# Patient Record
Sex: FEMALE | Race: ASIAN | NOT HISPANIC OR LATINO | URBAN - METROPOLITAN AREA
[De-identification: names, ages, dates, MRNs, and addresses within clinical notes are randomized per-mention and may not be internally consistent; named-entity substitution may affect disease eponyms.]

---

## 2017-02-14 ENCOUNTER — OFFICE VISIT - HEALTHEAST (OUTPATIENT)
Dept: FAMILY MEDICINE | Facility: CLINIC | Age: 52
End: 2017-02-14

## 2017-02-14 ENCOUNTER — RECORDS - HEALTHEAST (OUTPATIENT)
Dept: GENERAL RADIOLOGY | Facility: CLINIC | Age: 52
End: 2017-02-14

## 2017-02-14 DIAGNOSIS — E04.1 THYROID NODULE: ICD-10-CM

## 2017-02-14 DIAGNOSIS — R73.9 ELEVATED BLOOD SUGAR: ICD-10-CM

## 2017-02-14 DIAGNOSIS — Z00.00 HEALTH CARE MAINTENANCE: ICD-10-CM

## 2017-02-14 DIAGNOSIS — E78.5 HYPERLIPIDEMIA: ICD-10-CM

## 2017-02-14 DIAGNOSIS — R79.89 LOW TSH LEVEL: ICD-10-CM

## 2017-02-14 DIAGNOSIS — G89.29 OTHER CHRONIC PAIN: ICD-10-CM

## 2017-02-14 DIAGNOSIS — G89.29 CHRONIC PAIN OF RIGHT ANKLE: ICD-10-CM

## 2017-02-14 DIAGNOSIS — M25.571 PAIN IN RIGHT ANKLE AND JOINTS OF RIGHT FOOT: ICD-10-CM

## 2017-02-14 DIAGNOSIS — M25.571 CHRONIC PAIN OF RIGHT ANKLE: ICD-10-CM

## 2017-02-14 LAB
HBA1C MFR BLD: 6.1 % (ref 3.5–6)
LDLC SERPL CALC-MCNC: 77 MG/DL

## 2017-02-15 ENCOUNTER — AMBULATORY - HEALTHEAST (OUTPATIENT)
Dept: FAMILY MEDICINE | Facility: CLINIC | Age: 52
End: 2017-02-15

## 2017-02-15 ENCOUNTER — COMMUNICATION - HEALTHEAST (OUTPATIENT)
Dept: FAMILY MEDICINE | Facility: CLINIC | Age: 52
End: 2017-02-15

## 2017-02-15 DIAGNOSIS — E04.1 THYROID NODULE: ICD-10-CM

## 2017-02-15 DIAGNOSIS — E55.9 VITAMIN D DEFICIENCY: ICD-10-CM

## 2017-02-16 ENCOUNTER — OFFICE VISIT - HEALTHEAST (OUTPATIENT)
Dept: PHYSICAL THERAPY | Facility: REHABILITATION | Age: 52
End: 2017-02-16

## 2017-02-16 DIAGNOSIS — M25.571 CHRONIC PAIN OF RIGHT ANKLE: ICD-10-CM

## 2017-02-16 DIAGNOSIS — M62.81 MUSCLE WEAKNESS (GENERALIZED): ICD-10-CM

## 2017-02-16 DIAGNOSIS — R29.818 DIFFICULTY BALANCING: ICD-10-CM

## 2017-02-16 DIAGNOSIS — G89.29 CHRONIC PAIN OF RIGHT ANKLE: ICD-10-CM

## 2017-02-20 ENCOUNTER — HOSPITAL ENCOUNTER (OUTPATIENT)
Dept: ULTRASOUND IMAGING | Facility: HOSPITAL | Age: 52
Discharge: HOME | End: 2017-02-20
Attending: PHYSICIAN ASSISTANT

## 2017-02-20 DIAGNOSIS — E04.1 THYROID NODULE: ICD-10-CM

## 2017-02-21 ENCOUNTER — OFFICE VISIT - HEALTHEAST (OUTPATIENT)
Dept: PHYSICAL THERAPY | Facility: REHABILITATION | Age: 52
End: 2017-02-21

## 2017-02-21 DIAGNOSIS — R29.818 DIFFICULTY BALANCING: ICD-10-CM

## 2017-02-21 DIAGNOSIS — M25.571 CHRONIC PAIN OF RIGHT ANKLE: ICD-10-CM

## 2017-02-21 DIAGNOSIS — M62.81 MUSCLE WEAKNESS (GENERALIZED): ICD-10-CM

## 2017-02-21 DIAGNOSIS — G89.29 CHRONIC PAIN OF RIGHT ANKLE: ICD-10-CM

## 2017-02-22 ENCOUNTER — COMMUNICATION - HEALTHEAST (OUTPATIENT)
Dept: FAMILY MEDICINE | Facility: CLINIC | Age: 52
End: 2017-02-22

## 2017-02-28 ENCOUNTER — COMMUNICATION - HEALTHEAST (OUTPATIENT)
Dept: FAMILY MEDICINE | Facility: CLINIC | Age: 52
End: 2017-02-28

## 2017-02-28 ENCOUNTER — OFFICE VISIT - HEALTHEAST (OUTPATIENT)
Dept: PHYSICAL THERAPY | Facility: REHABILITATION | Age: 52
End: 2017-02-28

## 2017-02-28 DIAGNOSIS — M25.571 CHRONIC PAIN OF RIGHT ANKLE: ICD-10-CM

## 2017-02-28 DIAGNOSIS — M62.81 MUSCLE WEAKNESS (GENERALIZED): ICD-10-CM

## 2017-02-28 DIAGNOSIS — R29.818 DIFFICULTY BALANCING: ICD-10-CM

## 2017-02-28 DIAGNOSIS — G89.29 CHRONIC PAIN OF RIGHT ANKLE: ICD-10-CM

## 2017-02-28 DIAGNOSIS — R59.9 ENLARGED LYMPH NODE: ICD-10-CM

## 2017-03-07 ENCOUNTER — OFFICE VISIT - HEALTHEAST (OUTPATIENT)
Dept: PHYSICAL THERAPY | Facility: REHABILITATION | Age: 52
End: 2017-03-07

## 2017-03-07 DIAGNOSIS — G89.29 CHRONIC PAIN OF RIGHT ANKLE: ICD-10-CM

## 2017-03-07 DIAGNOSIS — M62.81 MUSCLE WEAKNESS (GENERALIZED): ICD-10-CM

## 2017-03-07 DIAGNOSIS — R29.818 DIFFICULTY BALANCING: ICD-10-CM

## 2017-03-07 DIAGNOSIS — M25.571 CHRONIC PAIN OF RIGHT ANKLE: ICD-10-CM

## 2017-03-22 ENCOUNTER — OFFICE VISIT - HEALTHEAST (OUTPATIENT)
Dept: PHYSICAL THERAPY | Facility: REHABILITATION | Age: 52
End: 2017-03-22

## 2017-03-22 DIAGNOSIS — M62.81 MUSCLE WEAKNESS (GENERALIZED): ICD-10-CM

## 2017-03-22 DIAGNOSIS — R29.818 DIFFICULTY BALANCING: ICD-10-CM

## 2017-03-22 DIAGNOSIS — G89.29 CHRONIC PAIN OF RIGHT ANKLE: ICD-10-CM

## 2017-03-22 DIAGNOSIS — M25.571 CHRONIC PAIN OF RIGHT ANKLE: ICD-10-CM

## 2017-04-05 ENCOUNTER — COMMUNICATION - HEALTHEAST (OUTPATIENT)
Dept: FAMILY MEDICINE | Facility: CLINIC | Age: 52
End: 2017-04-05

## 2017-04-11 ENCOUNTER — COMMUNICATION - HEALTHEAST (OUTPATIENT)
Dept: FAMILY MEDICINE | Facility: CLINIC | Age: 52
End: 2017-04-11

## 2017-04-18 ENCOUNTER — OFFICE VISIT - HEALTHEAST (OUTPATIENT)
Dept: FAMILY MEDICINE | Facility: CLINIC | Age: 52
End: 2017-04-18

## 2017-04-18 ENCOUNTER — RECORDS - HEALTHEAST (OUTPATIENT)
Dept: MAMMOGRAPHY | Facility: CLINIC | Age: 52
End: 2017-04-18

## 2017-04-18 DIAGNOSIS — Z12.31 VISIT FOR SCREENING MAMMOGRAM: ICD-10-CM

## 2017-04-18 DIAGNOSIS — Z12.31 ENCOUNTER FOR SCREENING MAMMOGRAM FOR MALIGNANT NEOPLASM OF BREAST: ICD-10-CM

## 2017-04-18 DIAGNOSIS — G89.29 CHRONIC PAIN OF RIGHT ANKLE: ICD-10-CM

## 2017-04-18 DIAGNOSIS — M25.571 CHRONIC PAIN OF RIGHT ANKLE: ICD-10-CM

## 2017-04-26 ENCOUNTER — COMMUNICATION - HEALTHEAST (OUTPATIENT)
Dept: FAMILY MEDICINE | Facility: CLINIC | Age: 52
End: 2017-04-26

## 2018-03-08 ENCOUNTER — COMMUNICATION - HEALTHEAST (OUTPATIENT)
Dept: FAMILY MEDICINE | Facility: CLINIC | Age: 53
End: 2018-03-08

## 2018-03-08 DIAGNOSIS — E55.9 VITAMIN D DEFICIENCY: ICD-10-CM

## 2018-04-23 ENCOUNTER — RECORDS - HEALTHEAST (OUTPATIENT)
Dept: MAMMOGRAPHY | Facility: CLINIC | Age: 53
End: 2018-04-23

## 2018-04-23 DIAGNOSIS — Z12.31 ENCOUNTER FOR SCREENING MAMMOGRAM FOR MALIGNANT NEOPLASM OF BREAST: ICD-10-CM

## 2018-10-10 ENCOUNTER — OFFICE VISIT - HEALTHEAST (OUTPATIENT)
Dept: FAMILY MEDICINE | Facility: CLINIC | Age: 53
End: 2018-10-10

## 2018-10-10 DIAGNOSIS — R87.610 ATYPICAL SQUAMOUS CELLS OF UNDETERMINED SIGNIFICANCE (ASCUS) ON PAPANICOLAOU SMEAR OF CERVIX: ICD-10-CM

## 2018-10-10 DIAGNOSIS — Z00.00 ANNUAL PHYSICAL EXAM: ICD-10-CM

## 2018-10-10 DIAGNOSIS — R42 DIZZINESS: ICD-10-CM

## 2018-10-10 DIAGNOSIS — R73.03 PRE-DIABETES: ICD-10-CM

## 2018-10-10 DIAGNOSIS — E55.9 VITAMIN D DEFICIENCY: ICD-10-CM

## 2018-10-10 DIAGNOSIS — E78.5 HYPERLIPIDEMIA: ICD-10-CM

## 2018-10-10 DIAGNOSIS — R07.89 CHEST DISCOMFORT: ICD-10-CM

## 2018-10-10 LAB
ALT SERPL W P-5'-P-CCNC: 18 U/L (ref 0–45)
CHOLEST SERPL-MCNC: 133 MG/DL
CREAT SERPL-MCNC: 0.66 MG/DL (ref 0.6–1.1)
FASTING STATUS PATIENT QL REPORTED: YES
GFR SERPL CREATININE-BSD FRML MDRD: >60 ML/MIN/1.73M2
HBA1C MFR BLD: 5.9 % (ref 3.5–6)
HDLC SERPL-MCNC: 38 MG/DL
LDLC SERPL CALC-MCNC: 69 MG/DL
TRIGL SERPL-MCNC: 132 MG/DL

## 2018-10-10 ASSESSMENT — MIFFLIN-ST. JEOR: SCORE: 1149.16

## 2018-10-11 LAB
25(OH)D3 SERPL-MCNC: 18 NG/ML (ref 30–80)
HPV SOURCE: NORMAL
HUMAN PAPILLOMA VIRUS 16 DNA: NEGATIVE
HUMAN PAPILLOMA VIRUS 18 DNA: NEGATIVE
HUMAN PAPILLOMA VIRUS FINAL DIAGNOSIS: NORMAL
HUMAN PAPILLOMA VIRUS OTHER HR: NEGATIVE
SPECIMEN DESCRIPTION: NORMAL

## 2018-10-18 LAB
BKR LAB AP ABNORMAL BLEEDING: NO
BKR LAB AP BIRTH CONTROL/HORMONES: NORMAL
BKR LAB AP CERVICAL APPEARANCE: NORMAL
BKR LAB AP GYN ADEQUACY: NORMAL
BKR LAB AP GYN INTERPRETATION: NORMAL
BKR LAB AP HPV REFLEX: NORMAL
BKR LAB AP LMP: NORMAL
BKR LAB AP PATIENT STATUS: NORMAL
BKR LAB AP PREVIOUS ABNORMAL: NORMAL
BKR LAB AP PREVIOUS NORMAL: 2014
HIGH RISK?: NO
PATH REPORT.COMMENTS IMP SPEC: NORMAL
RESULT FLAG (HE HISTORICAL CONVERSION): NORMAL

## 2019-03-08 ENCOUNTER — COMMUNICATION - HEALTHEAST (OUTPATIENT)
Dept: FAMILY MEDICINE | Facility: CLINIC | Age: 54
End: 2019-03-08

## 2019-03-08 DIAGNOSIS — E55.9 VITAMIN D DEFICIENCY: ICD-10-CM

## 2019-03-09 RX ORDER — ERGOCALCIFEROL 1.25 MG/1
CAPSULE ORAL
Qty: 12 CAPSULE | Refills: 3 | Status: SHIPPED | OUTPATIENT
Start: 2019-03-09

## 2021-05-30 VITALS — BODY MASS INDEX: 28.63 KG/M2 | WEIGHT: 142 LBS

## 2021-05-30 VITALS — WEIGHT: 139 LBS | BODY MASS INDEX: 28.02 KG/M2

## 2021-06-02 VITALS — HEIGHT: 59 IN | BODY MASS INDEX: 28.43 KG/M2 | WEIGHT: 141 LBS

## 2021-06-08 NOTE — PROGRESS NOTES
"  Subjective:      Mini Agudelo is a 52 y.o. female who presents for evaluation of inability to work.  She has a workforce form that needs to be completed today.  She says she is unable to work.  When I ask her why, she says it is because of right leg pain.  This been a problem for years.  With further discussion, she says it is really only her right ankle that is bothersome.  She says [place].  She was walking and somehow twisted her ankle and fell.  She apparently broke her ankle and could not walk on it.  She was taken to the clinic, and then the hospital for surgery.  She had a pin or kika put in the ankle.  She later had the hardware removed.  She says ever since that injury, her ankle has been \"loose\" and chronically painful.  No pain with rest or ROM exercises.  Pain if she is standing or walking for a while (5 minutes?).  No other concerns.  She had a physical done last fall.  She did not mention anything about this at that time.  I have received her old records after that visit.  She had several things that we should follow-up on, including history of hyperglycemia, TSH abnormality, hyperlipidemia, ASCUS Pap.  I reviewed old lab records today.  Also reviewed Thyroid ultrasound from 2014 that showed mild thyromegaly with bilateral thyroid lobe nodules.  This report is scanned in under the media tab.    Patient Active Problem List   Diagnosis     Hyperlipidemia     Pre-diabetes     Low TSH level     ASCUS pap     Latent tuberculosis     Chronic pain of right ankle     No current outpatient prescriptions on file.     Objective:     No Known Allergies  Vitals:    02/14/17 1002   BP: 120/68   Pulse: 80   Resp: 12   SpO2: 97%     Body mass index is 28.02 kg/(m^2).    Vitals reviewed as above.  General: Patient is alert and oriented x 3, in no apparent distress  Cardiac: Regular rate and rhythm, no murmurs  Pulmonary: lungs clear to ausculation, no crackles, rales, rhonchi, or " wheezing  Musculoskeletal: normal 4/5 strength in major muscle groups in lower extremities bilaterally, normal foot strength, bilateral feet have normal pedal pulses, bilateral feet and ankles appear grossly normal, no obvious gross deformities.  No ligament laxity noted in the right ankle, no pain with palpation of the ankle joint, main area of pain is the medial malleolus of the right ankle.    Skin: Skin is healthy and normal.  No bruising or swelling.    Recent Results (from the past 240 hour(s))   LDL Cholesterol, Direct   Result Value Ref Range    Direct LDL 77 <=129 mg/dl   Thyroid Stimulating Hormone (TSH)   Result Value Ref Range    TSH 0.37 0.30 - 5.00 uIU/mL   Glycosylated Hemoglobin A1c   Result Value Ref Range    Hemoglobin A1c 6.1 (H) 3.5 - 6.0 %   Hemoglobin   Result Value Ref Range    Hemoglobin 12.7 12.0 - 16.0 g/dL     Other labs pending.    I personally reviewed ankle x-ray today.  XR ANKLE RIGHT 3 OR MORE VWS2/14/2017 10:42 AM  INDICATION: chronic pain, history of fracture COMPARISON: None.  FINDINGS: Old deformity of the distal fibula. Otherwise negative ankle. No acute fracture or dislocation.  This report was electronically interpreted by: Dr. Greg Jensen MD ON 02/14/2017 at 12:59    Assessment and Plan:     1.  Chronic right ankle pain.  I filled out her workforce form to say that she would be unable to do weightbearing activity for 30-45 days.  I think she would be able to do seated work during this time.  I would like her to see physical therapy to see if they can improve her pain.  If so, then she should be able to work full time without restrictions.  If there appears to be any problems after physical therapy, consider getting her a workability evaluation.    2.  History of pre-diabetes.  A1c today is 6.1.  We'll continue to monitor.    2.  History of abnormal TSH level and mild thyromegaly.  Normal TSH today.  Will get a repeat ultrasound of thyroid, then follow-up as appropriate.    4.   History of hyperlipidemia.  LDL direct is normal today.    5.  History of ASCUS Pap.  She had ASCUS Pap with negative HPV in 2014.  Guidelines would recommend she get another Pap in 2017.  I reviewed that with her today.  She declines to get a Pap today.  She may consider it in the future.    This dictation uses voice recognition software, which may contain typographical errors.

## 2021-06-08 NOTE — PROGRESS NOTES
[facility]   Foot/Ankle Initial Evaluation    Patient Name: Mini Agudelo  Date of evaluation: 2/16/2017  Referral Diagnosis: Chronic right ankle pain  Referring provider: Yancy Bermudez, *  Visit Diagnosis:     ICD-10-CM    1. Chronic pain of right ankle M25.571     G89.29    2. Muscle weakness (generalized) M62.81    3. Difficulty balancing R29.818        Assessment:     Mini Agudelo is a 52 y.o. female who presents to therapy today with chief complaints of ankle pain starting more than 10 years ago when she [place], She states she has surgery to place a kika and then was casted for a couple fo weeks and then she had surgery to remove the hardware and was on crutches for a long time. Patient reports since that time the ankle has felt weak, unstable and she is afraid to use it to go longer periods of time or on uneven ground for fear of hurting the ankle again. No real pain in the ankle but she describes instability and fatigue. Patient demonstrates an overall weakness of the right LE with noted atrophy of the mid foot muscles when compared to the other side. Patient will benefit from skilled 1:1 PT intervention to increase strength of the R LE, correct gait mechanics and increase stability of the foot and balance for improve patients overall function.     Impairments in  pain, posture, ROM, joint mobility, strength, gait/locomotion and balance  The POC is dynamic and will be modified on an ongoing basis.  Barriers to achieving goals as noted in the assessment section may affect outcome.  Prognosis to achieve goals is  fair   Pt. is appropriate for skilled PT intervention as outlined in the Plan of Care (POC).  Pt. is a good candidate for skilled PT services to improve pain levels and function.    Goals:  Pt. will be independent with home exercise program in : 6 weeks  Pt. will be able to walk : 20 minutes;on even surfaces;on uneven/inclined surfaces;with less pain;with less  difficulty;for household mobility;for community mobility;for work;for exercise/recreation  Patient will stand : 30 minutes;with less pain;with less difficultty;for home chores;for work;in 6 weeks  No Data Recorded    Barriers to Learning or Achieving Goals:  Chronicity of the problem.  Co-morbidities or other medical factors.  Thyroid issues, pre-diabetes  Language barriers.Brigido  present for entire evaluation and treatment today    Patient's expectations/goals are realistic.       Plan / Patient Instructions:        Plan of Care:   Authorization / Certification Start Date: 02/16/17  Communication with: Referral Source  Patient Related Instruction: Nature of Condition;Treatment plan and rationale;Self Care instruction;Basis of treatment;Body mechanics;Posture  Times per Week: 1-2  Number of Weeks: 6  Number of Visits: 8 sessions  Therapeutic Exercise: ROM;Stretching;Strengthening  Neuromuscular Reeducation: kinesio tape;posture;postural restoration;core;balance/proprioception  Manual Therapy: soft tissue mobilization;myofascial release;joint mobilization;muscle energy    Plan for next visit: reassess HEP, advance exercises as able, work on balance and walking mechanics, add hip strengthening exercises     Subjective:         Social information:   Living Situation:apartment, lives with others , stairs  with railing and she lives with her  and children in an apartment with steps   Occupation:unemployed   Work Status:NA   Equipment Available: None    History of Present Illness:    Mini is a 52 y.o. female who presents to therapy today with complaints of chronic right ankle pain starting years ago when she was carrying firewood and she fell down and broke her ankle [place]. Per patient report she went to the clinic and had a kika placed for the broken ankle and then later had an additional surgery to remove the hardware and the ankle pain has been present since the injury. Per patient  she feels she only had the metal kika in her ankle for a couple of weeks and then had another surgery to remove it. She used crutches after the second surgery for 2-3 months afterwards and even then she had not walked as much because it was difficult. She has been in the  for 7 years, initially living in [place] and moved to [place] less than a year ago.     Pain Ratin  Pain rating at best: 0  Pain rating at worst: 5  Pain description: weakness and She states she does not actually have pain since coming to the  but she feels it is weak and she can not trust it for walking longer distances, uneven ground and with slippery conditions    Functional limitations are described as occurring with:   ascending and descending stairs or curbs  balance  lifting  standing >5 min  walking >5 min    Patient reports benefit from:  rest       Objective:      Note: Items left blank indicates the item was not performed or not indicated at the time of the evaluation.    Patient Outcome Measures :    Not completed today    Ankle/Foot Examination  1. Chronic pain of right ankle     2. Muscle weakness (generalized)     3. Difficulty balancing       Precautions: None  Involved Side: Right  Posture Observation:      General standing posture is normal.  Lower extremity:  Foot/Ankle:  toe out  right   Assistive Device: None  Gait Observation: initial contact on right foot is mid foot, decreased supination movement with no push off great toe.   Hip Clearing: Does not provoke symptoms  Knee Clearing: Does not provoke symptoms    Foot/Ankle ROM:        Date: 2017  Ankle AROM ( )   Right    Left   Right   Left   Right   Left   Dorsiflexion-Gastroc (10 )   5 5                 Dorsiflexion-Soleus (20 )   5   5               Plantar Flexion (50 )   WNL WNL                 Inversion (45-60 )   WNL   WNL               Eversion (15-30 )   WNL WNL                 Great Toe Extension (70 )                     Great Toe Flexion (MTP 45 , IP  "90 )                     Ankle PROM ( )   Right   Left   Right   Left   Right   Left   Dorsiflexion-Gastroc (10 )   10 10                 Dorsiflexion-Soleus (20 )   15   15               Plantar Flexion (50 )   WNL WNL                 Inversion (45-60 )   WNL   WNL               Eversion (15-30 )   WNL WNL                 Great Toe Extension (70 )   WNL WNL                 Great Toe Flexion (MTP 45 , IP 90 )                        Foot/Ankle Strength:         Date:  2/16/2017   Ankle/Foot MMT (/5)   Right   Left   Right   Left   Right   Left   Dorsiflexion 4+ 5       Plantar flexion 4 5       Inversion 4 5       Eversion 4 5       Great Toe Extension 4 5         Flexibility:  WNL    Palpation:  Non tender throughout the right ankle and foot    Foot/Ankle Special Tests:  2/16/2017   Ligament Tests (+/-)  Right  Left  Fracture Tests (+/-)  Right   Left     Anterior Drawer   -  -    [place] Ankle Rule          Talar Tilt   -  -    [place] Foot Rule          Impingement Tests (+/-)  Right  Left   Heel Tap \"Bump\"      Impingement sign     Squeeze Test      Impingement sign cluster   1. Ant-lat ankle tenderness.   2. Ant-lat ankle swelling.   3. Pain with forced DF and Eversion.   4. Pain with SL squat.   5. Pain with activities.   6. Ankle instability.    (5 or more is positive) - -   [place] Test      Achilles Tests (+/-)  RIght   Left  Plantar Fasciitis Tests (+/-)  Right  Left    Milian's Calf Squeeze  -   -    Windlass (NWB) for plantar fasciitis           Arc Sign         Windlass (WB) for plantar fasciitis           [place] Test        Other:          Other:        Other:          Other:        Other:           Treatment Today   2/16/2017  TREATMENT MINUTES COMMENTS   Evaluation 35 Low Complexity Ankle evaluation    Self-care/ Home management     Manual therapy     Neuromuscular Re-education     Therapeutic Activity     Therapeutic Exercises 25 HEP with handouts given today  - toe towel curls  - great toe " lifts  - isometric ball B Inversion   - Isometric Ball eversion  Education on muscle testing and how strengthening not only ankle but foot and hips can assist in increasing her balance and endurance to decrease the instability and fatigue she feels   Gait training     Modality__________________                Total 60    Blank areas are intentional and mean the treatment did not include these items.     PT Evaluation Code: (Please list factors)  Patient History/Comorbidities: pre-diabetes, thyroid  Examination: weakness, gait abnormalities and difficulty balancing  Clinical Presentation: Stable  Clinical Decision Making: Low    Patient History/  Comorbidities Examination  (body structures and functions, activity limitations, and/or participation restrictions) Clinical Presentation Clinical Decision Making (Complexity)   No documented Comorbidities or personal factors 1-2 Elements Stable and/or uncomplicated Low   1-2 documented comorbidities or personal factor 3 Elements Evolving clinical presentation with changing characteristics Moderate   3-4 documented comorbidities or personal factors 4 or more Unstable and unpredictable High Carri Rivas, PT, DPT  2/16/2017  7:07 AM

## 2021-06-09 NOTE — PROGRESS NOTES
[facility] Daily Progress/Discharge Summary    Patient Name: Mini Agudelo  Date: 3/22/2017  Visit #: 5  PTA visit #:    Referral Diagnosis:Chronic right ankle pain  Referring provider: Yancy Bermudez, *  Visit Diagnosis:     ICD-10-CM    1. Chronic pain of right ankle M25.571     G89.29    2. Muscle weakness (generalized) M62.81    3. Difficulty balancing R29.818      Mini Agudelo is a 52 y.o. female who presents to therapy today with chief complaints of ankle pain starting more than 10 years ago when she [place], She states she has surgery to place a kika and then was casted for a couple fo weeks and then she had surgery to remove the hardware and was on crutches for a long time. Patient reports since that time the ankle has felt weak, unstable and she is afraid to use it to go longer periods of time or on uneven ground for fear of hurting the ankle again. No real pain in the ankle but she describes instability and fatigue. Patient demonstrates an overall weakness of the right LE with noted atrophy of the mid foot muscles when compared to the other side. Patient will benefit from skilled 1:1 PT intervention to increase strength of the R LE, correct gait mechanics and increase stability of the foot and balance for improve patients overall function      Assessment:     HEP/POC compliance is  good .  Patient demonstrates understanding/independence with home program.  Patient has met all of her goals for therapy, increased strength and ROM noted in the ankle and she feels ready to discharge from therapy today.     Goal Status:  Pt. will be independent with home exercise program in : 6 weeks MET  Pt. will be able to walk : 20 minutes;on even surfaces;on uneven/inclined surfaces;with less pain;with less difficulty;for household mobility;for community mobility;for work;for exercise/recreation MET  Patient will stand : 30 minutes;with less pain;with less difficultty;for home chores;for  work;in 6 weeks MET      Plan / Patient Education:     Discharge from therapy with HEP today    Subjective:     Pain Rating: no pain   Overall feels about the same, she feels it is ok. She is not worried about falling or injuring the ankle at this time. She feels she can walk before it gets tired. Still avoiding uneven ground when walking.   At this time she feels she is doing well and could continue on her own and is ready to be discharged from therapy.         Objective:   Ankle ROM: all motions on right ankle WNL and painfree     Foot/Ankle Strength:   Date: INITIAL  2/16/2017   DC 3/22/17  Ankle/Foot MMT (/5) Right Left Right Left Right Left   Dorsiflexion 4+ 5  5   5       Plantar flexion 4 5  4+  5       Inversion 4 5  5  5       Eversion 4 5  5  5       Great Toe Extension 4 5  5  5           HEP:    - windshield wipers same and opposite direction reviewed today  - standing on step Heel toe raises 2 x 10 reps  - standing hip abduction 2 x 10 reps B  - Band ankle exercises L2 band DF, inversion and eversion x 10 reps B (given L2 band today)  - Standing hip extension 2 x 10B  - SL stance 2 x 20 sec on right side only - added to HEP today  Continue with isometric ball squeeze for eversion and inversion at home  Continue great toe lifts and little toe lifts and towel toe curls    Treatment Today   3/22/2017  TREATMENT MINUTES COMMENTS   Evaluation     Self-care/ Home management     Manual therapy     Neuromuscular Re-education     Therapeutic Activity     Therapeutic Exercises 28 NuStep WL 5 x 5 minutes  Exercises as above   Gait training     Modality__________________                Total 28    Blank areas are intentional and mean the treatment did not include these items.     Carri Rivas, PT, DPT  3/22/2017

## 2021-06-09 NOTE — PROGRESS NOTES
[facility] Daily Progress     Patient Name: Mini Agudelo  Date: 2/28/2017  Visit #: 3  PTA visit #:    Referral Diagnosis:Chronic right ankle pain  Referring provider: Yancy Bermudez, *  Visit Diagnosis:     ICD-10-CM    1. Chronic pain of right ankle M25.571     G89.29    2. Muscle weakness (generalized) M62.81    3. Difficulty balancing R29.818      Mini Agudelo is a 52 y.o. female who presents to therapy today with chief complaints of ankle pain starting more than 10 years ago when she [place], She states she has surgery to place a kika and then was casted for a couple fo weeks and then she had surgery to remove the hardware and was on crutches for a long time. Patient reports since that time the ankle has felt weak, unstable and she is afraid to use it to go longer periods of time or on uneven ground for fear of hurting the ankle again. No real pain in the ankle but she describes instability and fatigue. Patient demonstrates an overall weakness of the right LE with noted atrophy of the mid foot muscles when compared to the other side. Patient will benefit from skilled 1:1 PT intervention to increase strength of the R LE, correct gait mechanics and increase stability of the foot and balance for improve patients overall function      Assessment:     HEP/POC compliance is  good .  Patient demonstrates understanding/independence with home program.  Patient is benefitting from skilled physical therapy and is making steady progress toward functional goals.  Patient is appropriate to continue with skilled physical therapy intervention, as indicated by initial plan of care.    Goal Status:  Pt. will be independent with home exercise program in : 6 weeks  Pt. will be able to walk : 20 minutes;on even surfaces;on uneven/inclined surfaces;with less pain;with less difficulty;for household mobility;for community mobility;for work;for exercise/recreation  Patient will stand : 30  minutes;with less pain;with less difficultty;for home chores;for work;in 6 weeks      Plan / Patient Education:     Continue with initial plan of care.  Progress with home program as tolerated.  Plan for next visit: continue wtih BAPS,  reassess HEP, advance exercises as able, work on balance and walking mechanics, add hip strengthening exercises  Subjective:     Pain Rating: Just faitgued and weak  Patient reports no changes overall, the exercises make her ankle feel tired not painful. She feels her movement is better but walking in the same and after a certain amount of time her ankle will tighten up and she needs to rest.       Objective:     HEP:   - toe towel curls reviewed continue for HEP  - great toe lifts x `12 reps, little toe salutes x 12 reps (added to HEP)   - windshield wipers same direction x 10 reps  - windshield wiper opposite direction x 10 reps  Continue with isometric ball squeeze for eversion and inversion at home  Added:  - standing Heel toe raises 2 x 10 reps    Treatment Today   2/28/2017  TREATMENT MINUTES COMMENTS   Evaluation     Self-care/ Home management     Manual therapy     Neuromuscular Re-education     Therapeutic Activity     Therapeutic Exercises 28 NuStep WL 5 x 5 minutes  BAPS Board L2 F/B, IV/EVersion and CW/CCW x 30 each direction - cues to control motion and slow down, very difficult to do inversion and eversion motions for her; especially inversion and then that translated to the CW/CCW circles being difficult as well into inversion  Exercises as above   Gait training     Modality__________________                Total 28    Blank areas are intentional and mean the treatment did not include these items.     Carri Rivas, PT, DPT  2/28/2017

## 2021-06-09 NOTE — PROGRESS NOTES
[facility] Daily Progress     Patient Name: Mini Agudelo  Date: 3/7/2017  Visit #: 4  PTA visit #:    Referral Diagnosis:Chronic right ankle pain  Referring provider: Yancy Bermudez, *  Visit Diagnosis:     ICD-10-CM    1. Chronic pain of right ankle M25.571     G89.29    2. Muscle weakness (generalized) M62.81    3. Difficulty balancing R29.818      Mini Agudelo is a 52 y.o. female who presents to therapy today with chief complaints of ankle pain starting more than 10 years ago when she [place], She states she has surgery to place a kika and then was casted for a couple fo weeks and then she had surgery to remove the hardware and was on crutches for a long time. Patient reports since that time the ankle has felt weak, unstable and she is afraid to use it to go longer periods of time or on uneven ground for fear of hurting the ankle again. No real pain in the ankle but she describes instability and fatigue. Patient demonstrates an overall weakness of the right LE with noted atrophy of the mid foot muscles when compared to the other side. Patient will benefit from skilled 1:1 PT intervention to increase strength of the R LE, correct gait mechanics and increase stability of the foot and balance for improve patients overall function      Assessment:     HEP/POC compliance is  good .  Patient demonstrates understanding/independence with home program.  Patient is benefitting from skilled physical therapy and is making steady progress toward functional goals.  Patient is appropriate to continue with skilled physical therapy intervention, as indicated by initial plan of care.    Goal Status:  Pt. will be independent with home exercise program in : 6 weeks Progressing/ongoing  Pt. will be able to walk : 20 minutes;on even surfaces;on uneven/inclined surfaces;with less pain;with less difficulty;for household mobility;for community mobility;for work;for exercise/recreation  Ongoing/progressing   Patient will stand : 30 minutes;with less pain;with less difficultty;for home chores;for work;in 6 weeks Ongoing/progressing (15 minutes per patient)      Plan / Patient Education:     Continue with initial plan of care.  Progress with home program as tolerated.  Plan for next visit: continue wtih BAPS,  reassess HEP, advance exercises as able, work on balance and walking mechanics, add hip strengthening exercises  Subjective:     Pain Rating: Just faitgued and weak   Ankle seems to be getting better slowly, no pain just tired out when walking for a period of time.   Slowly getting better      Objective:     HEP:    - windshield wipers same and opposite directions x 10 reps each B  - standing on step Heel toe raises 2 x 10 reps  - standing hip abduction 2 x 10 reps B  - Band ankle exercises L1 band DF, inversion and eversion x 10 reps B   Continue with isometric ball squeeze for eversion and inversion at home  Continue great toe lifts and little toe lifts and towel toe curls    Treatment Today   3/7/2017  TREATMENT MINUTES COMMENTS   Evaluation     Self-care/ Home management     Manual therapy     Neuromuscular Re-education     Therapeutic Activity     Therapeutic Exercises 25 NuStep WL 5 x 5 minutes  Exercises as above   Gait training     Modality__________________                Total 25    Blank areas are intentional and mean the treatment did not include these items.     Carri Rivas, PT, DPT  3/7/2017

## 2021-06-09 NOTE — PROGRESS NOTES
[facility] Daily Progress     Patient Name: Mini Agudelo  Date: 2/21/2017  Visit #: 2  PTA visit #:    Referral Diagnosis:Chronic right ankle pain  Referring provider: Yancy Bermudez, *  Visit Diagnosis:     ICD-10-CM    1. Chronic pain of right ankle M25.571     G89.29    2. Muscle weakness (generalized) M62.81    3. Difficulty balancing R29.818      Mini Agudelo is a 52 y.o. female who presents to therapy today with chief complaints of ankle pain starting more than 10 years ago when she [place], She states she has surgery to place a kika and then was casted for a couple fo weeks and then she had surgery to remove the hardware and was on crutches for a long time. Patient reports since that time the ankle has felt weak, unstable and she is afraid to use it to go longer periods of time or on uneven ground for fear of hurting the ankle again. No real pain in the ankle but she describes instability and fatigue. Patient demonstrates an overall weakness of the right LE with noted atrophy of the mid foot muscles when compared to the other side. Patient will benefit from skilled 1:1 PT intervention to increase strength of the R LE, correct gait mechanics and increase stability of the foot and balance for improve patients overall function      Assessment:     HEP/POC compliance is  good .  Patient demonstrates understanding/independence with home program.  Patient is benefitting from skilled physical therapy and is making steady progress toward functional goals.  Patient is appropriate to continue with skilled physical therapy intervention, as indicated by initial plan of care.    Goal Status:  Pt. will be independent with home exercise program in : 6 weeks  Pt. will be able to walk : 20 minutes;on even surfaces;on uneven/inclined surfaces;with less pain;with less difficulty;for household mobility;for community mobility;for work;for exercise/recreation  Patient will stand : 30  minutes;with less pain;with less difficultty;for home chores;for work;in 6 weeks      Plan / Patient Education:     Continue with initial plan of care.  Progress with home program as tolerated.  Plan for next visit: BAPS,  reassess HEP, advance exercises as able, work on balance and walking mechanics, add hip strengthening exercises  Subjective:     Pain Rating: Just faitgued and weak  Patient reports no changes overall, the exercises make her ankle feel tired not painful. She overall is doing ok.       Objective:     HEP:   - toe towel curls 2 x 10 reps (towel on slide board)  - great toe lifts x `12 reps  Added  - windshield wipers same direction x 10 reps  - windshield wiper opposite direction x 10 reps  Continue with isometric ball squeeze for eversion and inversion at home    Treatment Today   2/21/2017  TREATMENT MINUTES COMMENTS   Evaluation     Self-care/ Home management     Manual therapy     Neuromuscular Re-education     Therapeutic Activity     Therapeutic Exercises 30 NuStep WL 5 x 5 minutes  BAPS Board L2 F/B, IV/EVersion and CW/CCW x 30 each direction - cues to control motion and slow down, very difficult to do inversion and eversion motions for her; especially inversion and then that translated to the CW/CCW circles being difficult as well into inversion  Exercises as above   Gait training     Modality__________________                Total 30 Patient was late x 5 minutes to appointment   Blank areas are intentional and mean the treatment did not include these items.     Carri Rivas, PT, DPT  2/21/2017

## 2021-06-10 NOTE — PROGRESS NOTES
"  Subjective:      Mini Agudelo is a 52 y.o. female who presents for follow-up right ankle pain.  I originally saw her for this concern on 2/14/2017, please see that note for details.  Ankle pain was first brought up because she had a work form that needed to be filled out.  She was said she was unable to stand on that foot for any amount of time due to chronic ankle pain.  She reported an ankle injury/fracture when she was younger.  Ankle x-ray done on that day showed \"old deformity of the distal fibula,\" otherwise normal.  I sent patient to physical therapy.  She has completed several PT visits, most recently on 3/22/2017.  I reviewed the most recent PT note today, patient had significant improvement and felt she was ready to be discharged from therapy.  All of her PT goals were met, including standing/walking for 20-30 minutes with less pain.  Today, patient says she does feel like physical therapy helped her ankle a little bit, but not completely.  She says that she can stand for at least 15 minutes without pain or trouble.  She feels like her toes toe movement has improved and her ankle movement overall has improved.  She is unable to give me any real specifics today, she is a somewhat poor historian.    Patient Active Problem List   Diagnosis     Hyperlipidemia     Pre-diabetes     ASCUS pap     Latent tuberculosis     Chronic pain of right ankle     Nodular goiter     Vitamin D deficiency     Enlarged lymph node       Current Outpatient Prescriptions:      cholecalciferol, vitamin D3, 50,000 unit Tab, Take 1 tablet by mouth once a week., Disp: 12 tablet, Rfl: 3     Objective:     No Known Allergies  Vitals:    04/18/17 0845   BP: 124/70   Pulse: 70   Resp: 14   SpO2: 98%     Body mass index is 28.63 kg/(m^2).    Vitals reviewed as above.  General: Patient is alert and oriented x 3, in no apparent distress  Cardiac: Regular rate and rhythm, no murmurs  Pulmonary: lungs clear to ausculation, no crackles, rales, " rhonchi, or wheezing  Musculoskeletal: Right ankle appears healthy and normal, no swelling or obvious gross deformities, no pain with palpation on bilateral malleoli or ankle joint, no ligament laxity in the ankle joint, full active range of motion of the ankle joint without pain, full active range of motion of the toes, normal strength in the foot, patient walks in a normal tandem gait    Assessment and Plan:     Follow-up chronic right ankle pain.  She says she needs a new workforce form filled out.  It is difficult for me to give a detailed and accurate description of her limitations, based on normal exam, and her somewhat poor historian.  Also it looks like she has met her PT goals.  Therefore, I think I workability evaluation would be appropriate.  Referral placed today.  I wrote a note for patient explaining this.  In the meantime, she could certainly continue with seated work.    This dictation uses voice recognition software, which may contain typographical errors.

## 2021-06-15 PROBLEM — E04.9 NODULAR GOITER: Status: ACTIVE | Noted: 2017-02-15

## 2021-06-15 PROBLEM — R59.9 ENLARGED LYMPH NODE: Status: ACTIVE | Noted: 2017-02-28

## 2021-06-15 PROBLEM — E55.9 VITAMIN D DEFICIENCY: Status: ACTIVE | Noted: 2017-02-15

## 2021-06-15 PROBLEM — G89.29 CHRONIC PAIN OF RIGHT ANKLE: Status: ACTIVE | Noted: 2017-02-14

## 2021-06-15 PROBLEM — M25.571 CHRONIC PAIN OF RIGHT ANKLE: Status: ACTIVE | Noted: 2017-02-14

## 2021-06-20 NOTE — PROGRESS NOTES
Subjective:     Mini Agudelo is a 53 y.o. female who presents for an annual exam.     Other concerns today:  Chest discomfort with coughing.  Patient is a poor historian, and it is difficult for her to give a lot of detailed information about her symptoms.  She says that sometimes when she coughs her chest feels a bit sore.  Occasionally she feels dizzy.  She thinks the symptoms usually happen more in the winter.  When she is dizzy she occasionally has blurry vision.  No burning chest or stomach pain.    Chronic right ankle pain.  Overall is doing okay, worse with activity.    She is not working right now.  Occasional burning low back pain.    Immunization History   Administered Date(s) Administered     Hep A, historic 2011, 2012     Hep B, historic 2011     Influenza, inj, historic,unspecified 2011, 10/26/2012     Influenza, seasonal,quad inj 36+ mos 10/04/2016     MMR 2011     Meningococcal MCV4 Conjugate,Unspecified 2011     Td,adult,historic,unspecified 2011, 2011     Tdap 2011, 2012     Varicella 2011, 2012       Gynecologic History  Patient's last menstrual period was 2015.  Contraception: post menopausal status  Last Pap:   Last mammogram: . Results were: normal    OB History    Para Term  AB Living   8 8 8      SAB TAB Ectopic Multiple Live Births             # Outcome Date GA Lbr Bennie/2nd Weight Sex Delivery Anes PTL Lv   8 Term            7 Term            6 Term            5 Term            4 Term            3 Term            2 Term            1 Term                     Current Outpatient Prescriptions:      cholecalciferol, vitamin D3, 50,000 unit Tab, Take 1 tablet by mouth once a week., Disp: 12 tablet, Rfl: 3  Past Medical History:   Diagnosis Date     Breast cyst      Carpal tunnel syndrome      Hemorrhagic cyst of ovary - Left 2011     Past Surgical History:   Procedure Laterality Date     ANKLE  FRACTURE SURGERY Right      TUBAL LIGATION       Review of patient's allergies indicates no known allergies.  Family History   Problem Relation Age of Onset     Thyroid disease Mother      Social History     Social History     Marital status:      Spouse name: N/A     Number of children: N/A     Years of education: N/A     Occupational History     Not on file.     Social History Main Topics     Smoking status: Former Smoker     Smokeless tobacco: Current User     Types: Chew      Comment: Bitel Nut and chewing tobacco.     Alcohol use Not on file     Drug use: Not on file     Sexual activity: Not on file     Other Topics Concern     Not on file     Social History Narrative       Review of Systems  Complete Review of Systems is discussed with patient and is negative except as noted in HPI.    Objective:     Vitals:    10/10/18 0800   BP: 122/80   Pulse: 70   Resp: 20   Temp: 97.3  F (36.3  C)   SpO2: 97%     Body mass index is 28.24 kg/(m^2).    Physical Exam:  General: Patient is alert and Oriented x 3, in no apparent distress.  HEENT, Thyroid, Lymphatic, Cardiac, Pulmonary, GI, Musculoskeletal, and Neuro exams were completed today and grossly normal.  Breast Exam: No lumps, skin changes, lymphadenopathy, or nipple discharge noted bilaterally.  Genitourinary Exam: External genitalia is normal in appearance, vaginal walls are healthy and without lesions, no significant discharge noted in the vaginal vault, cervix is well visualized and normal in appearance.  Pap was taken without difficulty.    Labs pending.  PHQ9 = 5    Assessment and Plan:     1. Annual physical exam  Health Maintenance discussed with patient as appropriate for age and risk factors.  Screening Pap smear completed today.  She is up-to-date on her mammogram.  She declines colonoscopy.  She is willing to try colo-guard, order placed.  She got her flu shot today.  She declined STD testing.    I recommended visit to dentist.  She has overall poor  dentition.  She says that the last time she was to the dentist they recommended significant dental surgery.  She does not want to do that.  I recommended she at least return for cleaning.  She will think about this.  - Lipid Cascade  - ALT (SGPT)  - Glycosylated Hemoglobin A1c  - Creatinine    2. Pre-diabetes  Lab Results   Component Value Date    HGBA1C 6.1 (H) 02/14/2017   I will follow-up with screening A1c today.  - Lipid Cascade  - ALT (SGPT)  - Glycosylated Hemoglobin A1c  - Creatinine    3. Hyperlipidemia  Lipid panel pending.  I will follow-up with results.  We have not had an accurate fasting lipid check for her previously.  If lipids elevated, consider starting statin.  - Lipid Cascade  - ALT (SGPT)  - Glycosylated Hemoglobin A1c  - Creatinine    4.  Vitamin D deficiency  Results pending.  Will continue with daily vitamin D supplement if necessary.    5.  Occasional episodes of chest discomfort with coughing and dizziness.  Grossly normal EKG done in 2016.  She is a non-smoker.  Exam is normal and reassuring today.  No red flags on exam or history.  Continue to monitor for now.  If symptoms worsen or change, could consider chest x-ray, EKG, or further appropriate workup.    The following high BMI interventions were performed this visit: encouragement to exercise and lifestyle education regarding diet    This dictation uses voice recognition software, which may contain typographical errors.

## 2021-06-24 NOTE — TELEPHONE ENCOUNTER
RN cannot approve Refill Request    RN can NOT refill this medication med is not covered by policy/route to provider. Last office visit: 4/18/2017 Yancy Bermudez PA-C Last Physical: 10/10/2018 Last MTM visit: Visit date not found Last visit same specialty: 4/18/2017 Yancy Bermudez PA-C.  Next visit within 3 mo: Visit date not found  Next physical within 3 mo: Visit date not found      Irene Tarango, [facility] Triage/Med Refill 3/9/2019    Requested Prescriptions   Pending Prescriptions Disp Refills     ergocalciferol (VITAMIN D2) 50,000 unit capsule [Pharmacy Med Name: VIT D2 1.25 MG (50,000 UNIT 73240 CAP] 12 capsule 3     Sig: TAKE 1 CAPSULE EVERY WEEK // 1 AS MONICA SPARKS 1 LUB    There is no refill protocol information for this order

## 2024-04-29 ENCOUNTER — OFFICE VISIT (OUTPATIENT)
Dept: FAMILY MEDICINE | Facility: CLINIC | Age: 59
End: 2024-04-29
Payer: MEDICAID

## 2024-04-29 VITALS
TEMPERATURE: 98.3 F | HEART RATE: 97 BPM | RESPIRATION RATE: 16 BRPM | HEIGHT: 55 IN | SYSTOLIC BLOOD PRESSURE: 126 MMHG | BODY MASS INDEX: 29.33 KG/M2 | WEIGHT: 126.75 LBS | DIASTOLIC BLOOD PRESSURE: 81 MMHG | OXYGEN SATURATION: 94 %

## 2024-04-29 DIAGNOSIS — M79.631 PAIN OF RIGHT FOREARM: ICD-10-CM

## 2024-04-29 DIAGNOSIS — E78.5 DYSLIPIDEMIA: ICD-10-CM

## 2024-04-29 DIAGNOSIS — E11.9 TYPE 2 DIABETES MELLITUS WITHOUT COMPLICATION, WITHOUT LONG-TERM CURRENT USE OF INSULIN (H): Primary | ICD-10-CM

## 2024-04-29 LAB
ALBUMIN SERPL BCG-MCNC: 4.3 G/DL (ref 3.5–5.2)
ALP SERPL-CCNC: 106 U/L (ref 40–150)
ALT SERPL W P-5'-P-CCNC: 20 U/L (ref 0–50)
ANION GAP SERPL CALCULATED.3IONS-SCNC: 11 MMOL/L (ref 7–15)
AST SERPL W P-5'-P-CCNC: 23 U/L (ref 0–45)
BASOPHILS # BLD AUTO: 0.1 10E3/UL (ref 0–0.2)
BASOPHILS NFR BLD AUTO: 1 %
BILIRUB DIRECT SERPL-MCNC: <0.2 MG/DL (ref 0–0.3)
BILIRUB SERPL-MCNC: 0.6 MG/DL
BUN SERPL-MCNC: 11.2 MG/DL (ref 6–20)
CALCIUM SERPL-MCNC: 9.2 MG/DL (ref 8.6–10)
CHLORIDE SERPL-SCNC: 104 MMOL/L (ref 98–107)
CHOLEST SERPL-MCNC: 157 MG/DL
CREAT SERPL-MCNC: 0.6 MG/DL (ref 0.51–0.95)
DEPRECATED HCO3 PLAS-SCNC: 25 MMOL/L (ref 22–29)
EGFRCR SERPLBLD CKD-EPI 2021: >90 ML/MIN/1.73M2
EOSINOPHIL # BLD AUTO: 1.8 10E3/UL (ref 0–0.7)
EOSINOPHIL NFR BLD AUTO: 20 %
ERYTHROCYTE [DISTWIDTH] IN BLOOD BY AUTOMATED COUNT: 12.9 % (ref 10–15)
FASTING STATUS PATIENT QL REPORTED: NO
GLUCOSE SERPL-MCNC: 151 MG/DL (ref 70–99)
HBA1C MFR BLD: 5.3 % (ref 0–5.6)
HCT VFR BLD AUTO: 37 % (ref 35–47)
HDLC SERPL-MCNC: 36 MG/DL
HGB BLD-MCNC: 12.8 G/DL (ref 11.7–15.7)
IMM GRANULOCYTES # BLD: 0 10E3/UL
IMM GRANULOCYTES NFR BLD: 0 %
LDLC SERPL CALC-MCNC: ABNORMAL MG/DL
LDLC SERPL DIRECT ASSAY-MCNC: 68 MG/DL
LYMPHOCYTES # BLD AUTO: 2.6 10E3/UL (ref 0.8–5.3)
LYMPHOCYTES NFR BLD AUTO: 28 %
MCH RBC QN AUTO: 30.9 PG (ref 26.5–33)
MCHC RBC AUTO-ENTMCNC: 34.6 G/DL (ref 31.5–36.5)
MCV RBC AUTO: 89 FL (ref 78–100)
MONOCYTES # BLD AUTO: 0.6 10E3/UL (ref 0–1.3)
MONOCYTES NFR BLD AUTO: 7 %
NEUTROPHILS # BLD AUTO: 4.1 10E3/UL (ref 1.6–8.3)
NEUTROPHILS NFR BLD AUTO: 44 %
NONHDLC SERPL-MCNC: 121 MG/DL
PLATELET # BLD AUTO: 238 10E3/UL (ref 150–450)
POTASSIUM SERPL-SCNC: 3.6 MMOL/L (ref 3.4–5.3)
PROT SERPL-MCNC: 7.8 G/DL (ref 6.4–8.3)
RBC # BLD AUTO: 4.14 10E6/UL (ref 3.8–5.2)
SODIUM SERPL-SCNC: 140 MMOL/L (ref 135–145)
TRIGL SERPL-MCNC: 466 MG/DL
TSH SERPL DL<=0.005 MIU/L-ACNC: 1.21 UIU/ML (ref 0.3–4.2)
VIT B12 SERPL-MCNC: 387 PG/ML (ref 232–1245)
WBC # BLD AUTO: 9.2 10E3/UL (ref 4–11)

## 2024-04-29 PROCEDURE — 99204 OFFICE O/P NEW MOD 45 MIN: CPT | Performed by: FAMILY MEDICINE

## 2024-04-29 PROCEDURE — 83721 ASSAY OF BLOOD LIPOPROTEIN: CPT | Mod: 59 | Performed by: FAMILY MEDICINE

## 2024-04-29 PROCEDURE — 82248 BILIRUBIN DIRECT: CPT | Performed by: FAMILY MEDICINE

## 2024-04-29 PROCEDURE — 85025 COMPLETE CBC W/AUTO DIFF WBC: CPT | Performed by: FAMILY MEDICINE

## 2024-04-29 PROCEDURE — 83036 HEMOGLOBIN GLYCOSYLATED A1C: CPT | Performed by: FAMILY MEDICINE

## 2024-04-29 PROCEDURE — 84443 ASSAY THYROID STIM HORMONE: CPT | Performed by: FAMILY MEDICINE

## 2024-04-29 PROCEDURE — 80061 LIPID PANEL: CPT | Performed by: FAMILY MEDICINE

## 2024-04-29 PROCEDURE — 82607 VITAMIN B-12: CPT | Performed by: FAMILY MEDICINE

## 2024-04-29 PROCEDURE — 99207 PR FOOT EXAM NO CHARGE: CPT | Performed by: FAMILY MEDICINE

## 2024-04-29 PROCEDURE — 36415 COLL VENOUS BLD VENIPUNCTURE: CPT | Performed by: FAMILY MEDICINE

## 2024-04-29 PROCEDURE — 80053 COMPREHEN METABOLIC PANEL: CPT | Performed by: FAMILY MEDICINE

## 2024-04-29 RX ORDER — METFORMIN HCL 500 MG
500 TABLET, EXTENDED RELEASE 24 HR ORAL
Qty: 360 TABLET | Refills: 3 | Status: SHIPPED | OUTPATIENT
Start: 2024-04-29 | End: 2024-07-31

## 2024-04-29 RX ORDER — NABUMETONE 500 MG/1
500 TABLET, FILM COATED ORAL 2 TIMES DAILY
Qty: 14 TABLET | Refills: 0 | Status: SHIPPED | OUTPATIENT
Start: 2024-04-29 | End: 2024-07-30

## 2024-04-29 RX ORDER — ATORVASTATIN CALCIUM 40 MG/1
40 TABLET, FILM COATED ORAL AT BEDTIME
Qty: 90 TABLET | Refills: 3 | Status: SHIPPED | OUTPATIENT
Start: 2024-04-29

## 2024-04-29 RX ORDER — LISINOPRIL 5 MG/1
2.5 TABLET ORAL DAILY
Qty: 90 TABLET | Refills: 3 | Status: SHIPPED | OUTPATIENT
Start: 2024-04-29

## 2024-04-29 NOTE — PROGRESS NOTES
OFFICE VISIT    Assessment/Plan:     Patient Instructions:    -Continue the medications as prescribed.  -Further recommendations will depend on lab results.    -Take the nabumetone 500 mg once every 12 hours scheduled for the next 7 days.  -Rest and limit usage of the affected area.  -Try to remain active and limit your activity based on discomfort.  -Apply a cold pack to the affected area for a maximum of 20 minutes at a time, once an hour as needed for pain and swelling.  Application of the cold pack for more than 20 minutes can increase risk of injuries to surrounding tissue.  -Apply a warm pack to the affected area as needed for discomforts.  The warm pack will help to improve blood flow and relax surrounding tissues.  You may make your own warm pack by doing the following: Take a sock, fill the sock with uncooked rice, and tie it off at the opened end.  You may place the sock and rice in the microwave for 30-60 seconds at a time or until warm.  Be cautious that the sock/rice is not too hot.  -Do stretches to help relax the surrounding muscles.  When doing stretches, be sure to hold your body in that position (avoid moving) and hold the stretch for 30 seconds.       Please seek immediate medical attention (go to the emergency room or urgent care) for the following reasons: worsening symptoms, or any concerning changes.      Leidy was seen today for establish care.  Diagnoses and all orders for this visit:    Type 2 diabetes mellitus without complication, without long-term current use of insulin (H)  Dyslipidemia: refills given as below. Check labs. Further recommendations pending results. Patient brings in medication bottles with her to clinic today.   -     atorvastatin (LIPITOR) 40 MG tablet; Take 1 tablet (40 mg) by mouth at bedtime  -     lisinopril (ZESTRIL) 5 MG tablet; Take 0.5 tablets (2.5 mg) by mouth daily  -     metFORMIN (GLUCOPHAGE XR) 500 MG 24 hr tablet; Take 1 tablet (500 mg) by mouth daily (with  dinner)  -     Hemoglobin A1c; Future  -     Basic metabolic panel; Future  -     CBC with Platelets & Differential; Future  -     Hepatic function panel; Future  -     Lipid panel reflex to direct LDL Non-fasting; Future  -     TSH with free T4 reflex; Future  -     Vitamin B12; Future  -     FOOT EXAM    Pain of right forearm: localized swelling noted on the distal radius . No falls/injuries. Positive Finkelstein test. Plan for medication management as below.   -     nabumetone (RELAFEN) 500 MG tablet; Take 1 tablet (500 mg) by mouth 2 times daily for 7 days (Take with food)        Return in about 2 months (around 6/29/2024) for Annual Physical.    The diagnoses, treatment options, risk, benefits, and recommendations were reviewed with patient/guardian.  Questions were answered to patient's/guardian satisfaction.  Red flag signs were reviewed.  Patient/guardian is in agreement with above plan.      Subjective: 59 year old female who is new to the clinic who presents to clinic for the following complaints:   Patient presents with:  Establish Care    Answers submitted by the patient for this visit:  General Questionnaire (Submitted on 4/29/2024)  Chief Complaint: Chronic problems general questions HPI Form  What is the reason for your visit today? : establish care    Patient is originally from [place], [place]. She was seen at the [facility] ([address] [place] [address])   EMRE signed.    No wounds on feet. Sensations are normal.     Right wrist/arm pain: noted for 2-3 months now. Can't  her grandchild because of the pain. Tried massage, hot/cold packs and that has not helped significantly.       Medical history:   -Denies history of CAD.      Medications: taking medications as prescribed. No problems noted with the medication. Last time she had blood work done was a while ago. On review of the medication bottles and pills, patient does seem to consistently take the medications as prescribed.  "      Surgical history:   -Denies previous surgery.      Family history:  -Denies family history of CAD, diabetes, hypertension.       There is no immunization history on file for this patient.        A professional [employer] telephone  was utilized for the office visit.     The 10 point review of system is negative except as stated in the HPI.    Allergies were reviewed and updated.    Objective:   /81   Pulse 97   Temp 98.3  F (36.8  C) (Oral)   Resp 16   Ht 0.59 m (1' 11.23\")   Wt 57.5 kg (126 lb 12 oz)   SpO2 94%   .17 kg/m    General: Active, alert, nontoxic-appearing.  No acute distress.  HEENT: Normocephalic, atraumatic.  Pupils are equal and round.  Sclera is clear.  Normal external ears. Nares patent.  Moist mucous membranes.    Cardiac: RRR.  S1, S2 present.  No murmurs, rubs, or gallops.  Respiratory/chest: Clear to auscultation bilaterally.  No wheezes, rales, rhonchi.  Breathing is not labored.  No accessory muscle usage.  Abdomen: Soft, nondistended, nontender.  No masses or organomegaly noted.  No guarding or rebound tenderness appreciated.  Extremities: RUE: on the distal radius , the area is red, warm, painful to touch.  Finkelstein test is positive. Otherwise normal exam. LUE: normal. Voluntary movements intact.  Integumentary: Foot: normal sensations. Some dried skin noted. Otherwise, no concerning rash or skin changes appreciated.        Eulogio Hill MD  [facility] [place] [address]  Phone: [phone]  Fax: [phone]    5/1/2024  6:10 AM            Current Outpatient Medications   Medication Sig Dispense Refill    atorvastatin (LIPITOR) 40 MG tablet Take 1 tablet (40 mg) by mouth at bedtime 90 tablet 3    lisinopril (ZESTRIL) 5 MG tablet Take 0.5 tablets (2.5 mg) by mouth daily 90 tablet 3    metFORMIN (GLUCOPHAGE XR) 500 MG 24 hr tablet Take 1 tablet (500 mg) by mouth daily (with dinner) 360 tablet 3    nabumetone " (RELAFEN) 500 MG tablet Take 1 tablet (500 mg) by mouth 2 times daily for 7 days (Take with food) 14 tablet 0     No current facility-administered medications for this visit.       No Known Allergies    There are no problems to display for this patient.      No family history on file.    No past surgical history on file.     Social History     Socioeconomic History    Marital status: Single     Spouse name: Not on file    Number of children: Not on file    Years of education: Not on file    Highest education level: Not on file   Occupational History    Not on file   Tobacco Use    Smoking status: Never     Passive exposure: Never    Smokeless tobacco: Never   Vaping Use    Vaping status: Never Used   Substance and Sexual Activity    Alcohol use: Not on file    Drug use: Not on file    Sexual activity: Not on file   Other Topics Concern    Not on file   Social History Narrative    Not on file     Social Determinants of Health     Financial Resource Strain: Low Risk  (4/29/2024)    Financial Resource Strain     Within the past 12 months, have you or your family members you live with been unable to get utilities (heat, electricity) when it was really needed?: No   Food Insecurity: High Risk (4/29/2024)    Food Insecurity     Within the past 12 months, did you worry that your food would run out before you got money to buy more?: Yes     Within the past 12 months, did the food you bought just not last and you didn t have money to get more?: Yes   Transportation Needs: Low Risk  (4/29/2024)    Transportation Needs     Within the past 12 months, has lack of transportation kept you from medical appointments, getting your medicines, non-medical meetings or appointments, work, or from getting things that you need?: No   Physical Activity: Not on file   Stress: Not on file   Social Connections: Not on file   Interpersonal Safety: Low Risk  (4/29/2024)    Interpersonal Safety     Do you feel physically and emotionally safe  where you currently live?: Yes     Within the past 12 months, have you been hit, slapped, kicked or otherwise physically hurt by someone?: No     Within the past 12 months, have you been humiliated or emotionally abused in other ways by your partner or ex-partner?: No   Housing Stability: Low Risk  (4/29/2024)    Housing Stability     Do you have housing? : Yes     Are you worried about losing your housing?: No

## 2024-04-29 NOTE — PATIENT INSTRUCTIONS
"Nurse Triage SBAR    Is this a 2nd Level Triage? NO    Situation: pt reporting she is having a headache < 24 hours.     Background: pt reporting hx headaches.     Assessment:   Pt reporting she has had a headache starting yesterday evening and has not triaged taking a medication OTC for this.   Pt will attempt to treat headache at home and notify MD if symptoms do not improve or worsen.     Protocol Recommended Disposition:   Home Care    Recommendation: notify MD new/worse symptom      Routed to provider    Does the patient meet one of the following criteria for ADS visit consideration? 16+ years old, with an [facility] PCP     TIP  Providers, please consider if this condition is appropriate for management at one of our Acute and Diagnostic Services sites.     If patient is a good candidate, please use dotphrase <dot>triageresponse and select Refer to ADS to document.      1. LOCATION: \"Where does it hurt?\"       Started in front and radiates to back, right   2. ONSET: \"When did the headache start?\" (Minutes, hours or days)       Yesterday   3. PATTERN: \"Does the pain come and go, or has it been constant since it started?\"      Constant   4. SEVERITY: \"How bad is the pain?\" and \"What does it keep you from doing?\"  (e.g., Scale 1-10; mild, moderate, or severe)    - MILD (1-3): doesn't interfere with normal activities     - MODERATE (4-7): interferes with normal activities or awakens from sleep     - SEVERE (8-10): excruciating pain, unable to do any normal activities         Max: 6/10       Today; 4/10  5. RECURRENT SYMPTOM: \"Have you ever had headaches before?\" If Yes, ask: \"When was the last time?\" and \"What happened that time?\"      Yes   6. CAUSE: \"What do you think is causing the headache?\"      Pt reporting she has recently reduced caffeine intake.   7. MIGRAINE: \"Have you been diagnosed with migraine headaches?\" If Yes, ask: \"Is this headache similar?\"       Pt reporting headaches in the past where she was " -Thank you for choosing the [facility].  -It was a pleasure to see you today.  -Please take a look at the information below for more specific details regarding the treatment plan and recommendations.  -In this after visit summary is a list of your medications and specific instructions.  Please review this carefully as there may be changes made to your medication list.  -If there are any particular questions or concerns, please feel free to reach out to Dr. Hill.  -If any labs have been completed, we will reach out to you about results.  If the results are normal or not concerning, a letter or Tendrilhart message will be sent to you.  If any follow-up is needed, either Dr. Hill or the nurse will give you a call.  If you have not heard regarding results after 2 weeks, please reach out to the clinic.    Patient Instructions:    -Continue the medications as prescribed.  -Further recommendations will depend on lab results.    -Take the nabumetone 500 mg once every 12 hours scheduled for the next 7 days.  -Rest and limit usage of the affected area.  -Try to remain active and limit your activity based on discomfort.  -Apply a cold pack to the affected area for a maximum of 20 minutes at a time, once an hour as needed for pain and swelling.  Application of the cold pack for more than 20 minutes can increase risk of injuries to surrounding tissue.  -Apply a warm pack to the affected area as needed for discomforts.  The warm pack will help to improve blood flow and relax surrounding tissues.  You may make your own warm pack by doing the following: Take a sock, fill the sock with uncooked rice, and tie it off at the opened end.  You may place the sock and rice in the microwave for 30-60 seconds at a time or until warm.  Be cautious that the sock/rice is not too hot.  -Do stretches to help relax the surrounding muscles.  When doing stretches, be sure to hold your body in that position (avoid moving) and hold  "hospitalized. Pt was diagnosed with stress headaches.   8. HEAD INJURY: \"Has there been any recent injury to the head?\"       Denies   9. OTHER SYMPTOMS: \"Do you have any other symptoms?\" (fever, stiff neck, eye pain, sore throat, cold symptoms)      Denies   10. PREGNANCY: \"Is there any chance you are pregnant?\" \"When was your last menstrual period?\"        Denies     " the stretch for 30 seconds.       Please seek immediate medical attention (go to the emergency room or urgent care) for the following reasons: worsening symptoms, or any concerning changes.      --------------------------------------------------------------------------------------------------------------------    -We are always looking for ways to improve.  You may be selected to receive a survey regarding your visit today.  We encourage you to complete the survey and provide specific, constructive feedback to help us improve our processes.  Thank you for your time!  -Please review the contact information listed on the after visit summary and in the electronic chart.  Below is the phone number that we have on file.  If there are any changes that are needed to be made, please reach out to the clinic.  [phone] (home)

## 2024-04-29 NOTE — COMMUNITY RESOURCES LIST (ENGLISH)
April 29, 2024           YOUR PERSONALIZED LIST OF SERVICES & PROGRAMS           NAVIGATION    Eligibility Screening      [facility]ly [facility] Health insurance application assistance  [address] [place] [address] (Distance: 1.0 miles)  Phone: [phone]  Language: English, Stateless, German, Hmong  Fee: Free  Accessibility: Ada accessible, Translation services      [facility] Financial assistance services and MFIP  [address] [place] [address] (Distance: 1.0 miles)  Phone: [phone]  Language: English, Stateless, German, Hmong  Fee: Free  Accessibility: Ada accessible, Translation services      [employer]  Phone: (353) [phone]  Website: https://www.mnsure.org/about-us/assister-program/navigators/index.jsp  Language: English  Hours: Mon 8:00 AM - 4:00 PM Tue 8:00 AM - 4:00 PM Wed 8:00 AM - 4:00 PM Thu 8:00 AM - 4:00 PM        ASSISTANCE    Nutrition Benefits      [facility] application assistance  [address] [place] [address] (Distance: 1.0 miles)  Language: English  Fee: Free      [facility] application assistance  [address] [place] [address] (Distance: 1.0 miles)  Phone: [phone]  Language: English  Fee: Free      [employer] [place] - SNAP (formerly food stamps) Screening and Application help  Phone: (944) 523-7603  Website: https://www.hungersolutions.org/programs/[place]-food-helpline/  Language: English  Hours: Mon 10:00 AM - 5:00 PM Tue 10:00 AM - 5:00 PM Wed 10:00 AM - 5:00 PM Thu 10:00 AM - 5:00 PM Fri 10:00 AM - 5:00 PM  Fee: Free  Accessibility: Ada accessible, Blind accommodation, Deaf or hard of hearing, Translation services    [employer] - Food in the Sp[employer] at [facility]  [address] [place] [address] (Distance: 10.6 miles)  Phone: [phone]) [phone]  Website: https://www.goodinthehood.org/our-programs/feeding-the-future/food-in-the-spangler/  Language: English  Fee: Free   Accessibility: Ada accessible      [facility] Food Shelf - Food pantry  [address] [place] [address] (Distance: 3.8 miles)  Phone: (261) 397-2731  Website: http://www.CrowdTunes/  Language: English  Fee: Free      [employer]  Phone: (702) 110-2861  Website: https://www.Taamkru/empowerment-food-bank  Language: English  Hours: Mon 9:00 AM - 5:00 PM Tue 9:00 AM - 5:00 PM Wed 9:00 AM - 5:00 PM Thu 9:00 AM - 5:00 PM Fri 9:00 AM - 5:00 PM  Fee: Free               IMPORTANT NUMBERS & WEBSITES        Emergency Services  911  .   [facility]  211 http://211unitedway.org  .   Poison Control  (309) 799-6422 http://[place]poison.org http://wisconsinpoison.org  .     Suicide and Crisis Lifeline  988 http://988Snapfishline.org  .   Childhelp [place] Child Abuse Hotline  646.289.7939 http://Childhelphotline.org   .   National Sexual Assault Hotline  (439) 312-4657 (HOPE) http://memloomn.org   .     National Runaway Safeline  (144) 418-1517 (RUNAWAY) http://DissolveruShopSuey.org  .   Pregnancy & Postpartum Support  Call/text 621-391-0868  [place]: http://ppsupportmn.org  [place]: http://Vook.com/wi  .   Substance Abuse National Helpline ([facility])  367-046-HELP (0899) http://Findtreatment.gov   .                DISCLAIMER: These resources have been generated via the [employer]box Platform. [employer] does not endorse any service providers mentioned in this resource list. [employer] does not guarantee that the services mentioned in this resource list will be available to you or will improve your health or wellness.    [facility]

## 2024-05-01 PROBLEM — E78.5 DYSLIPIDEMIA: Status: ACTIVE | Noted: 2024-05-01

## 2024-05-01 PROBLEM — M79.631 PAIN OF RIGHT FOREARM: Status: ACTIVE | Noted: 2024-05-01

## 2024-05-01 PROBLEM — E11.9 TYPE 2 DIABETES MELLITUS WITHOUT COMPLICATION, WITHOUT LONG-TERM CURRENT USE OF INSULIN (H): Status: ACTIVE | Noted: 2024-05-01

## 2024-07-10 ENCOUNTER — TELEPHONE (OUTPATIENT)
Dept: MAMMOGRAPHY | Facility: CLINIC | Age: 59
End: 2024-07-10
Payer: COMMERCIAL

## 2024-07-10 NOTE — TELEPHONE ENCOUNTER
Patient Quality Outreach    Patient is due for the following:   Breast Cancer Screening - Mammogram    Next Steps:   Schedule a office visit for mammo    Type of outreach:    Unable to LM    Next Steps:  Reach out within 90 days via Phone.    Max number of attempts reached: No. Will try again in 90 days if patient still on fail list.    Questions for provider review:    None           [facility]  Chart routed to Care Team.

## 2024-07-30 ENCOUNTER — ANCILLARY PROCEDURE (OUTPATIENT)
Dept: GENERAL RADIOLOGY | Facility: CLINIC | Age: 59
End: 2024-07-30
Attending: FAMILY MEDICINE
Payer: COMMERCIAL

## 2024-07-30 ENCOUNTER — OFFICE VISIT (OUTPATIENT)
Dept: FAMILY MEDICINE | Facility: CLINIC | Age: 59
End: 2024-07-30
Payer: COMMERCIAL

## 2024-07-30 ENCOUNTER — ANCILLARY PROCEDURE (OUTPATIENT)
Dept: MAMMOGRAPHY | Facility: CLINIC | Age: 59
End: 2024-07-30
Attending: FAMILY MEDICINE
Payer: COMMERCIAL

## 2024-07-30 VITALS
BODY MASS INDEX: 26.36 KG/M2 | DIASTOLIC BLOOD PRESSURE: 73 MMHG | WEIGHT: 130.75 LBS | RESPIRATION RATE: 16 BRPM | SYSTOLIC BLOOD PRESSURE: 113 MMHG | HEART RATE: 109 BPM | HEIGHT: 59 IN | OXYGEN SATURATION: 96 % | TEMPERATURE: 99.1 F

## 2024-07-30 DIAGNOSIS — E11.9 TYPE 2 DIABETES MELLITUS WITHOUT COMPLICATION, WITHOUT LONG-TERM CURRENT USE OF INSULIN (H): ICD-10-CM

## 2024-07-30 DIAGNOSIS — M79.631 PAIN OF RIGHT FOREARM: ICD-10-CM

## 2024-07-30 DIAGNOSIS — H53.8 BLURRED VISION: ICD-10-CM

## 2024-07-30 DIAGNOSIS — Z23 NEED FOR VACCINATION: ICD-10-CM

## 2024-07-30 DIAGNOSIS — Z11.4 SCREENING FOR HIV (HUMAN IMMUNODEFICIENCY VIRUS): ICD-10-CM

## 2024-07-30 DIAGNOSIS — Z00.00 ROUTINE GENERAL MEDICAL EXAMINATION AT A HEALTH CARE FACILITY: Primary | ICD-10-CM

## 2024-07-30 DIAGNOSIS — Z12.31 VISIT FOR SCREENING MAMMOGRAM: ICD-10-CM

## 2024-07-30 DIAGNOSIS — Z12.4 CERVICAL CANCER SCREENING: ICD-10-CM

## 2024-07-30 DIAGNOSIS — Z12.11 SCREEN FOR COLON CANCER: ICD-10-CM

## 2024-07-30 DIAGNOSIS — E78.5 DYSLIPIDEMIA: ICD-10-CM

## 2024-07-30 DIAGNOSIS — Z11.59 NEED FOR HEPATITIS C SCREENING TEST: ICD-10-CM

## 2024-07-30 LAB — HBA1C MFR BLD: 7.2 % (ref 0–5.6)

## 2024-07-30 PROCEDURE — 99214 OFFICE O/P EST MOD 30 MIN: CPT | Mod: 25 | Performed by: FAMILY MEDICINE

## 2024-07-30 PROCEDURE — 73110 X-RAY EXAM OF WRIST: CPT | Mod: TC | Performed by: RADIOLOGY

## 2024-07-30 PROCEDURE — T1013 SIGN LANG/ORAL INTERPRETER: HCPCS | Mod: U4

## 2024-07-30 PROCEDURE — 90471 IMMUNIZATION ADMIN: CPT | Performed by: FAMILY MEDICINE

## 2024-07-30 PROCEDURE — 82570 ASSAY OF URINE CREATININE: CPT | Performed by: FAMILY MEDICINE

## 2024-07-30 PROCEDURE — 83036 HEMOGLOBIN GLYCOSYLATED A1C: CPT | Performed by: FAMILY MEDICINE

## 2024-07-30 PROCEDURE — 99396 PREV VISIT EST AGE 40-64: CPT | Mod: 25 | Performed by: FAMILY MEDICINE

## 2024-07-30 PROCEDURE — 82043 UR ALBUMIN QUANTITATIVE: CPT | Performed by: FAMILY MEDICINE

## 2024-07-30 PROCEDURE — 87389 HIV-1 AG W/HIV-1&-2 AB AG IA: CPT | Performed by: FAMILY MEDICINE

## 2024-07-30 PROCEDURE — 91320 SARSCV2 VAC 30MCG TRS-SUC IM: CPT | Performed by: FAMILY MEDICINE

## 2024-07-30 PROCEDURE — 36415 COLL VENOUS BLD VENIPUNCTURE: CPT | Performed by: FAMILY MEDICINE

## 2024-07-30 PROCEDURE — 90480 ADMN SARSCOV2 VAC 1/ONLY CMP: CPT | Performed by: FAMILY MEDICINE

## 2024-07-30 PROCEDURE — 77067 SCR MAMMO BI INCL CAD: CPT | Mod: TC | Performed by: RADIOLOGY

## 2024-07-30 PROCEDURE — 86803 HEPATITIS C AB TEST: CPT | Performed by: FAMILY MEDICINE

## 2024-07-30 PROCEDURE — 90677 PCV20 VACCINE IM: CPT | Performed by: FAMILY MEDICINE

## 2024-07-30 RX ORDER — NABUMETONE 500 MG/1
500 TABLET, FILM COATED ORAL 2 TIMES DAILY PRN
Qty: 60 TABLET | Refills: 2 | Status: SHIPPED | OUTPATIENT
Start: 2024-07-30

## 2024-07-30 SDOH — HEALTH STABILITY: PHYSICAL HEALTH: ON AVERAGE, HOW MANY DAYS PER WEEK DO YOU ENGAGE IN MODERATE TO STRENUOUS EXERCISE (LIKE A BRISK WALK)?: 0 DAYS

## 2024-07-30 SDOH — HEALTH STABILITY: PHYSICAL HEALTH: ON AVERAGE, HOW MANY MINUTES DO YOU ENGAGE IN EXERCISE AT THIS LEVEL?: 0 MIN

## 2024-07-30 ASSESSMENT — SOCIAL DETERMINANTS OF HEALTH (SDOH): HOW OFTEN DO YOU GET TOGETHER WITH FRIENDS OR RELATIVES?: ONCE A WEEK

## 2024-07-30 NOTE — PATIENT INSTRUCTIONS
-Thank you for choosing the [facility].  -It was a pleasure to see you today.  -Please take a look at the information below for more specific details regarding the treatment plan and recommendations.  -In this after visit summary is a list of your medications and specific instructions.  Please review this carefully as there may be changes made to your medication list.  -If there are any particular questions or concerns, please feel free to reach out to Dr. Hill.  -If any labs have been completed, we will reach out to you about results.  If the results are normal or not concerning, a letter or MyChart message will be sent to you.  If any follow-up is needed, either Dr. Hill or the nurse will give you a call.  If you have not heard regarding results after 2 weeks, please reach out to the clinic.    Patient Instructions:    - more medications at the pharmacy. The prescriptions should be enough medications to last through 04/2025.     -You are doing great.  -Continue to eat well.  Try to increase your servings of calcium as this can help your bones stay strong and healthy.  Follow a nutrition plan rich in fruits and vegetables and low in fats and cholesterol.    -Be sure to eat 5-7 servings of fruits and vegetables each day.  -Find ways to stay active.  Try to get 150 minutes of moderate activity (where you are breathing faster and slightly sweating) each week.  -Try to maintain a body mass index (BMI) of 18.5-25 as this is considered a healthier weight range.  -Brush your teeth twice daily.  See a dentist every 6-12 months.  -Be sure to use sunblock with SPF 15 or greater when going outside for extended periods of time.  Sunblock should be used even when it is a cloudy day.  Do intermittent skin checks for any concerning skin changes.  Wearing a wide brimmed hat and sunglasses can also be helpful to protect your skin from the sun.  -Monitor for any abnormal skin changes (such as new  moles/spots, painful moles, changes in your old moles, wounds that will not heal, multiple colors noted in one lesion, lesions that are asymmetric or not circular, or anything that is concerning for you). If any of these are noted, please schedule an appointment to be seen.     -It is generally recommended for you to complete a health care directive or living will. These documents will be able to reflect your wishes and desire in the case that you are unable to express them yourself. Please let Dr. Hill know if you would like some assistance with this process.    -Complete the cologuard stool tst once it is sent to your home.     -You were referred to the eye doctor.   -If you do not hear from the specialist to schedule an appointment within a week's time from today, please call the [facility] and speak with the specialty  to help you schedule the appointment to see the specialist.  Depending on the specialist availability, it may be a number of weeks prior to your scheduled appointment.    Please seek immediate medical attention (go to the emergency room or urgent care) for the following reasons: worsening symptoms, or any concerning changes.      --------------------------------------------------------------------------------------------------------------------    -We are always looking for ways to improve.  You may be selected to receive a survey regarding your visit today.  We encourage you to complete the survey and provide specific, constructive feedback to help us improve our processes.  Thank you for your time!  -Please review the contact information listed on the after visit summary and in the electronic chart.  Below is the phone number that we have on file.  If there are any changes that are needed to be made, please reach out to the clinic.  [phone] (home)     Patient Education   Preventive Care Advice   This is general advice given by our system to help you stay healthy. However, your  care team may have specific advice just for you. Please talk to your care team about your preventive care needs.  Nutrition  Eat 5 or more servings of fruits and vegetables each day.  Try wheat bread, brown rice and whole grain pasta (instead of white bread, rice, and pasta).  Get enough calcium and vitamin D. Check the label on foods and aim for 100% of the RDA (recommended daily allowance).  Lifestyle  Exercise at least 150 minutes each week  (30 minutes a day, 5 days a week).  Do muscle strengthening activities 2 days a week. These help control your weight and prevent disease.  No smoking.  Wear sunscreen to prevent skin cancer.  Have a dental exam and cleaning every 6 months.  Yearly exams  See your health care team every year to talk about:  Any changes in your health.  Any medicines your care team has prescribed.  Preventive care, family planning, and ways to prevent chronic diseases.  Shots (vaccines)   HPV shots (up to age 26), if you've never had them before.  Hepatitis B shots (up to age 59), if you've never had them before.  COVID-19 shot: Get this shot when it's due.  Flu shot: Get a flu shot every year.  Tetanus shot: Get a tetanus shot every 10 years.  Pneumococcal, hepatitis A, and RSV shots: Ask your care team if you need these based on your risk.  Shingles shot (for age 50 and up)  General health tests  Diabetes screening:  Starting at age 35, Get screened for diabetes at least every 3 years.  If you are younger than age 35, ask your care team if you should be screened for diabetes.  Cholesterol test: At age 39, start having a cholesterol test every 5 years, or more often if advised.  Bone density scan (DEXA): At age 50, ask your care team if you should have this scan for osteoporosis (brittle bones).  Hepatitis C: Get tested at least once in your life.  STIs (sexually transmitted infections)  Before age 24: Ask your care team if you should be screened for STIs.  After age 24: Get screened for STIs  if you're at risk. You are at risk for STIs (including HIV) if:  You are sexually active with more than one person.  You don't use condoms every time.  You or a partner was diagnosed with a sexually transmitted infection.  If you are at risk for HIV, ask about PrEP medicine to prevent HIV.  Get tested for HIV at least once in your life, whether you are at risk for HIV or not.  Cancer screening tests  Cervical cancer screening: If you have a cervix, begin getting regular cervical cancer screening tests starting at age 21.  Breast cancer scan (mammogram): If you've ever had breasts, begin having regular mammograms starting at age 40. This is a scan to check for breast cancer.  Colon cancer screening: It is important to start screening for colon cancer at age 45.  Have a colonoscopy test every 10 years (or more often if you're at risk) Or, ask your provider about stool tests like a FIT test every year or Cologuard test every 3 years.  To learn more about your testing options, visit:   .  For help making a decision, visit:   https://bit.ly/lw73250.  Prostate cancer screening test: If you have a prostate, ask your care team if a prostate cancer screening test (PSA) at age 55 is right for you.  Lung cancer screening: If you are a current or former smoker ages 50 to 80, ask your care team if ongoing lung cancer screenings are right for you.  For informational purposes only. Not to replace the advice of your health care provider. Copyright   2023 [employer]. All rights reserved. Clinically reviewed by the [facility]. [employer] 877376 - REV 01/24.

## 2024-07-30 NOTE — PROGRESS NOTES
Preventive Care Visit  [facility] NADIA Hill MD, Family Medicine  Jul 30, 2024      Assessment & Plan     Patient Instructions:    - more medications at the pharmacy. The prescriptions should be enough medications to last through 04/2025.     -You are doing great.  -Continue to eat well.  Try to increase your servings of calcium as this can help your bones stay strong and healthy.  Follow a nutrition plan rich in fruits and vegetables and low in fats and cholesterol.    -Be sure to eat 5-7 servings of fruits and vegetables each day.  -Find ways to stay active.  Try to get 150 minutes of moderate activity (where you are breathing faster and slightly sweating) each week.  -Try to maintain a body mass index (BMI) of 18.5-25 as this is considered a healthier weight range.  -Brush your teeth twice daily.  See a dentist every 6-12 months.  -Be sure to use sunblock with SPF 15 or greater when going outside for extended periods of time.  Sunblock should be used even when it is a cloudy day.  Do intermittent skin checks for any concerning skin changes.  Wearing a wide brimmed hat and sunglasses can also be helpful to protect your skin from the sun.  -Monitor for any abnormal skin changes (such as new moles/spots, painful moles, changes in your old moles, wounds that will not heal, multiple colors noted in one lesion, lesions that are asymmetric or not circular, or anything that is concerning for you). If any of these are noted, please schedule an appointment to be seen.     -It is generally recommended for you to complete a health care directive or living will. These documents will be able to reflect your wishes and desire in the case that you are unable to express them yourself. Please let Dr. Hill know if you would like some assistance with this process.    -Complete the cologuard stool tst once it is sent to your home.     -You were referred to the eye doctor.   -If you do not hear from  the specialist to schedule an appointment within a week's time from today, please call the [facility] and speak with the specialty  to help you schedule the appointment to see the specialist.  Depending on the specialist availability, it may be a number of weeks prior to your scheduled appointment.    Please seek immediate medical attention (go to the emergency room or urgent care) for the following reasons: worsening symptoms, or any concerning changes.      Leidy was seen today for physical.  Diagnoses and all orders for this visit:    Routine general medical examination at a health care facility    Type 2 diabetes mellitus without complication, without long-term current use of insulin (H): Recheck as below.  A1c noted to be 7.2, which is much higher than it was previously at 5.3 when checked 3 months ago.  Due to this, metformin dosing increased from 500 mg once daily to 500 mg twice daily.  Further recommendations pending response.  No order placed.  -     Albumin Random Urine Quantitative with Creat Ratio; Future  -     Adult Eye  Referral; Future  -     HEMOGLOBIN A1C; Future    Visit for screening mammogram  -     MA Screening Digital Bilateral; Future    Screen for colon cancer  -     COLOGUARD(EXACT SCIENCES); Future    Screening for HIV (human immunodeficiency virus)  -     HIV Antigen Antibody Combo; Future    Need for hepatitis C screening test  -     Hepatitis C Screen Reflex to HCV RNA Quant and Genotype; Future    Need for vaccination  -     Pneumococcal 20 Valent Conjugate (Prevnar 20)  -     COVID-19 12+ (2023-24) (PFIZER)    Cervical cancer screening    Blurred vision  -     Adult Eye  Referral; Future    Pain of right forearm: Symptoms improved, though ongoing.  Discomfort is noted at the distal radius bone.  Finkelstein test continues to be positive.  Had improvement nabumetone, so refill given as below.  Complete x-ray to further assess.  -     XR Wrist Right G/E 3  "Views; Future  -     nabumetone (RELAFEN) 500 MG tablet; Take 1 tablet (500 mg) by mouth 2 times daily as needed for pain (Take with food)    Other orders  -     PRIMARY CARE FOLLOW-UP SCHEDULING; Future      Patient has been advised of split billing requirements and indicates understanding: Yes        BMI  Estimated body mass index is 26.41 kg/m  as calculated from the following:    Height as of this encounter: 1.499 m (4' 11\").    Weight as of this encounter: 59.3 kg (130 lb 12 oz).       Counseling  Appropriate preventive services were addressed with this patient via screening, questionnaire, or discussion as appropriate for fall prevention, nutrition, physical activity, Tobacco-use cessation, weight loss and cognition.  Checklist reviewing preventive services available has been given to the patient.  Reviewed patient's diet, addressing concerns and/or questions.   The patient was instructed to see the dentist every 6 months.           Annette Forbes is a 59 year old, presenting for the following:  Physical (Declined pap smear)        7/30/2024     2:36 PM   Additional Questions   Roomed by Gabriel RAINEY   Accompanied by Daughter        Health Care Directive  Patient does not have a Health Care Directive or Living Will: See AVS.    HPI    Blurry vision: this has be noted recently. She had this symptom a long time ago and she was given an eye drop twice and it improved. She hasn't used the eye drop medication for a year now. She does not have the medication anymore. Had been seen by the eye doctor at her previous clinic.     Forearm pain (right): positive Finkelstein test noted from 04/29/2024. Treated with nabumatone and exercises. Since then, symptoms have been better, tough it is still aching a bit. Sometimes the pain is poking and achy. Right now, the pain is mild. 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 She is okay with x-ray today.     -Reviewed healthy eating and " exercise.  -Skin cancer screening: No concerning skin changes expressed by patient.  -Smoking status:   Tobacco Use      Smoking status: Never        Passive exposure: Never      Smokeless tobacco: Never      -Family history:   Family History   Problem Relation Age of Onset    Coronary Artery Disease No family hx of     Diabetes No family hx of     Hypertension No family hx of     Breast Cancer No family hx of     Ovarian Cancer No family hx of     Colon Cancer No family hx of        Preventative health recommendations, evaluation options, and risk/benefits of each were discussed with patient. Accepted recommendations were ordered. Otherwise, patient declined.  Health Maintenance Due   Topic Date Due    MICROALBUMIN  Never done    ANNUAL REVIEW OF HM ORDERS  Never done    ADVANCE CARE PLANNING  Never done    EYE EXAM  Never done    COLORECTAL CANCER SCREENING  Never done    HEPATITIS B IMMUNIZATION (2 of 3 - 19+ 3-dose series) 12/19/2019    ZOSTER IMMUNIZATION (2 of 2) 10/25/2022       -Immunizations due were reviewed.    Immunization History   Administered Date(s) Administered    COVID-19 12+ (2023-24) (Pfizer) 07/30/2024    COVID-19 Bivalent 18+ (Moderna) 02/10/2023    Hepatitis B, Adult 11/21/2019    Influenza Vaccine >6 months,quad, PF 03/15/2019, 11/06/2019, 11/05/2020, 11/12/2021, 08/30/2022    MMR 03/15/2019, 04/22/2019    Pneumococcal 20 valent Conjugate (Prevnar 20) 07/30/2024    Pneumococcal 23 valent 03/05/2020    TDAP Vaccine (Adacel) 07/07/2020    Td (Adult), Adsorbed 03/15/2019, 04/22/2019    Varicella 07/07/2020, 08/07/2020    Zoster recombinant adjuvanted (SHINGRIX) 08/30/2022       -Labs: Laboratory recommendations reviewed with patient.    -Breast cancer screening: biennial screening mammography for women aged 50 to 74 years. Last mammogram: No records noted.    -Cervical cancer screening: Last Pap smear: No previous Pap smears noted on chart review. Patient indicates that she got the pap smear in  early 2023. Results were all normal.  Reviewed recommendations. Update due date to 01/2028.     -Colon cancer screening: Last colonoscopy: No previous colonoscopy available.  Reviewed evaluation options.      Diabetes:  Hemoglobin A1C   Date Value Ref Range Status   07/30/2024 7.2 (H) 0.0 - 5.6 % Final     Comment:     Normal <5.7%   Prediabetes 5.7-6.4%    Diabetes 6.5% or higher     Note: Adopted from ADA consensus guidelines.   04/29/2024 5.3 0.0 - 5.6 % Final     Comment:     Normal <5.7%   Prediabetes 5.7-6.4%    Diabetes 6.5% or higher     Note: Adopted from ADA consensus guidelines.        Since the last visit, the things have been going well:     Patient is currently taking the following medications:  Diabetes Medication(s)       Biguanides       metFORMIN (GLUCOPHAGE XR) 500 MG 24 hr tablet Take 1 tablet (500 mg) by mouth 2 times daily (with meals)            Reviewed hypoglycemia protocol.  Reviewed recommendations for patient to complete annual eye exam.    Last Comprehensive Metabolic Panel:  Sodium   Date Value Ref Range Status   04/29/2024 140 135 - 145 mmol/L Final     Comment:     Reference intervals for this test were updated on 09/26/2023 to more accurately reflect our healthy population. There may be differences in the flagging of prior results with similar values performed with this method. Interpretation of those prior results can be made in the context of the updated reference intervals.      Potassium   Date Value Ref Range Status   04/29/2024 3.6 3.4 - 5.3 mmol/L Final     Chloride   Date Value Ref Range Status   04/29/2024 104 98 - 107 mmol/L Final     Carbon Dioxide (CO2)   Date Value Ref Range Status   04/29/2024 25 22 - 29 mmol/L Final     Anion Gap   Date Value Ref Range Status   04/29/2024 11 7 - 15 mmol/L Final     Glucose   Date Value Ref Range Status   04/29/2024 151 (H) 70 - 99 mg/dL Final     Urea Nitrogen   Date Value Ref Range Status   04/29/2024 11.2 6.0 - 20.0 mg/dL Final      Creatinine   Date Value Ref Range Status   04/29/2024 0.60 0.51 - 0.95 mg/dL Final     GFR Estimate   Date Value Ref Range Status   04/29/2024 >90 >60 mL/min/1.73m2 Final     Calcium   Date Value Ref Range Status   04/29/2024 9.2 8.6 - 10.0 mg/dL Final               7/30/2024   General Health   How would you rate your overall physical health? (!) FAIR   Feel stress (tense, anxious, or unable to sleep) To some extent      (!) STRESS CONCERN      7/30/2024   Nutrition   Three or more servings of calcium each day? Yes   Diet: Regular (no restrictions)   How many servings of fruit and vegetables per day? (!) 2-3   How many sweetened beverages each day? 0-1            7/30/2024   Exercise   Days per week of moderate/strenous exercise 0 days   Average minutes spent exercising at this level 0 min      (!) EXERCISE CONCERN      7/30/2024   Social Factors   Frequency of gathering with friends or relatives Once a week   Worry food won't last until get money to buy more Yes   Food not last or not have enough money for food? Yes   Do you have housing? (Housing is defined as stable permanent housing and does not include staying ouside in a car, in a tent, in an abandoned building, in an overnight shelter, or couch-surfing.) Yes   Are you worried about losing your housing? No   Lack of transportation? No   Unable to get utilities (heat,electricity)? No      (!) FOOD SECURITY CONCERN PRESENT      7/30/2024   Fall Risk   Fallen 2 or more times in the past year? No   Trouble with walking or balance? No             7/30/2024   Dental   Dentist two times every year? (!) NO            7/30/2024   TB Screening   Were you born outside of the [place]? Yes            Today's PHQ-2 Score:       4/29/2024    11:36 AM   PHQ-2 ( 1999 Pfizer)   Q1: Little interest or pleasure in doing things 0   Q2: Feeling down, depressed or hopeless 2   PHQ-2 Score 2   Q1: Little interest or pleasure in doing things Not at all   Q2: Feeling down, depressed or  "hopeless More than half the days   PHQ-2 Score 2         7/30/2024   Substance Use   Alcohol more than 3/day or more than 7/wk No   Do you use any other substances recreationally? No        Social History     Tobacco Use    Smoking status: Never     Passive exposure: Never    Smokeless tobacco: Never   Vaping Use    Vaping status: Never Used             7/30/2024   One time HIV Screening   Previous HIV test? I don't know          7/30/2024   STI Screening   New sexual partner(s) since last STI/HIV test? No        History of abnormal Pap smear: No - age 30-64 HPV with reflex Pap every 5 years recommended       ASCVD Risk   The 10-year ASCVD risk score (Benedicto SPRAGUE, et al., 2019) is: 4.2%    Values used to calculate the score:      Age: 59 years      Sex: Female      Is Non- : No      Diabetic: Yes      Tobacco smoker: No      Systolic Blood Pressure: 113 mmHg      Is BP treated: No      HDL Cholesterol: 36 mg/dL      Total Cholesterol: 157 mg/dL        Reviewed and updated as needed this visit by Provider                    No past medical history on file.  Past Surgical History:   Procedure Laterality Date    No previous surgery         The 10 point review of system was negative unless otherwise stated in the HPI.       Objective    Exam  /73   Pulse 109   Temp 99.1  F (37.3  C) (Oral)   Resp 16   Ht 1.499 m (4' 11\")   Wt 59.3 kg (130 lb 12 oz)   SpO2 96%   BMI 26.41 kg/m     Estimated body mass index is 26.41 kg/m  as calculated from the following:    Height as of this encounter: 1.499 m (4' 11\").    Weight as of this encounter: 59.3 kg (130 lb 12 oz).    Physical Exam  GENERAL: alert and no distress  EYES: Eyes grossly normal to inspection, PERRL and conjunctivae and sclerae normal  HENT: ear canals and TM's normal, nose and mouth without ulcers or lesions  NECK: no adenopathy, no asymmetry, masses, or scars  RESP: lungs clear to auscultation - no rales, rhonchi or " wheezes  CV: regular rate and rhythm, normal S1 S2, no S3 or S4, no murmur, click or rub, no peripheral edema  ABDOMEN: soft, nontender, no hepatosplenomegaly, no masses and bowel sounds normal  : patient declines.   MS: Pain noted to palpation of right distal radius and wrist region. Mild swelling. No redness. Otherwise, no gross musculoskeletal defects noted, no edema  SKIN: no suspicious lesions or rashes  NEURO: Normal strength and tone, mentation intact and speech normal  PSYCH: mentation appears normal, affect normal/bright        Signed Electronically by:     Eulogio Hill MD  [facility] [place] [address]  Phone: [phone]  Fax: [phone]    7/31/2024  9:59 AM

## 2024-07-31 PROBLEM — H53.8 BLURRED VISION: Status: ACTIVE | Noted: 2024-07-31

## 2024-07-31 LAB
CREAT UR-MCNC: 86.4 MG/DL
HCV AB SERPL QL IA: NONREACTIVE
HIV 1+2 AB+HIV1 P24 AG SERPL QL IA: NONREACTIVE
MICROALBUMIN UR-MCNC: 116 MG/L
MICROALBUMIN/CREAT UR: 134.26 MG/G CR (ref 0–25)

## 2024-07-31 RX ORDER — METFORMIN HCL 500 MG
500 TABLET, EXTENDED RELEASE 24 HR ORAL 2 TIMES DAILY WITH MEALS
Qty: 180 TABLET | Refills: 3 | Status: SHIPPED | OUTPATIENT
Start: 2024-07-31

## 2024-08-01 ENCOUNTER — TELEPHONE (OUTPATIENT)
Dept: FAMILY MEDICINE | Facility: CLINIC | Age: 59
End: 2024-08-01
Payer: COMMERCIAL

## 2024-08-01 NOTE — TELEPHONE ENCOUNTER
----- Message from Eulogio Hill sent at 7/31/2024  6:06 PM CDT -----  Team - please call patient with results.    Crowcedric Forbes [facility],    I hope you have been well since our last visit. Below are the results from the testing completed at the visit.     The diabetes test is higher and Dr. Hill recommends that you take the metformin tablet twice daily going forward.     The other labs are in the normal range.     Otherwise, Dr. Hill recommends that you continue on the plan as discussed in clinic.    If there are any questions or concerns, please call the clinic or schedule an appointment for follow up.     Best wishes,           Eulogio Hill MD  [facility]  7/31/2024  6:05 PM

## 2024-08-01 NOTE — TELEPHONE ENCOUNTER
Spoke to patient with [facility]  and relayed message.  Patient understood and did not have any questions.

## 2024-08-01 NOTE — TELEPHONE ENCOUNTER
----- Message from Eulogio Hill sent at 7/31/2024  6:05 PM CDT -----  Team - please call patient with results.    Antelmo Forbes [facility],    I hope you have been well since our last visit. Below are the results from the testing completed at the visit.     The x-ray of the right wrist is normal.     Dr. Hill recommends that you continue on the plan as discussed in clinic.    If there are any questions or concerns, please call the clinic or schedule an appointment for follow up.     Best wishes,           Eulogio Hill MD  [facility]  7/31/2024  6:04 PM

## 2024-08-01 NOTE — TELEPHONE ENCOUNTER
Spoke to patient with [facility]  and relayed message.  Patient understood and did not have any questions.

## 2024-08-13 ENCOUNTER — ORDERS ONLY (AUTO-RELEASED) (OUTPATIENT)
Dept: FAMILY MEDICINE | Facility: CLINIC | Age: 59
End: 2024-08-13
Payer: COMMERCIAL

## 2024-08-13 DIAGNOSIS — Z12.11 SCREEN FOR COLON CANCER: ICD-10-CM

## 2024-09-18 ENCOUNTER — TRANSFERRED RECORDS (OUTPATIENT)
Dept: MULTI SPECIALTY CLINIC | Facility: CLINIC | Age: 59
End: 2024-09-18

## 2024-09-18 LAB — RETINOPATHY: NORMAL

## 2024-11-04 ENCOUNTER — OFFICE VISIT (OUTPATIENT)
Dept: FAMILY MEDICINE | Facility: CLINIC | Age: 59
End: 2024-11-04
Attending: FAMILY MEDICINE
Payer: COMMERCIAL

## 2024-11-04 ENCOUNTER — ORDERS ONLY (AUTO-RELEASED) (OUTPATIENT)
Dept: FAMILY MEDICINE | Facility: CLINIC | Age: 59
End: 2024-11-04

## 2024-11-04 VITALS
SYSTOLIC BLOOD PRESSURE: 108 MMHG | TEMPERATURE: 98 F | WEIGHT: 133.08 LBS | OXYGEN SATURATION: 96 % | HEART RATE: 104 BPM | RESPIRATION RATE: 16 BRPM | BODY MASS INDEX: 26.83 KG/M2 | DIASTOLIC BLOOD PRESSURE: 73 MMHG | HEIGHT: 59 IN

## 2024-11-04 DIAGNOSIS — H53.8 BLURRED VISION: ICD-10-CM

## 2024-11-04 DIAGNOSIS — E78.5 DYSLIPIDEMIA: ICD-10-CM

## 2024-11-04 DIAGNOSIS — E11.9 TYPE 2 DIABETES MELLITUS WITHOUT COMPLICATION, WITHOUT LONG-TERM CURRENT USE OF INSULIN (H): Primary | ICD-10-CM

## 2024-11-04 DIAGNOSIS — Z01.84 IMMUNITY STATUS TESTING: ICD-10-CM

## 2024-11-04 DIAGNOSIS — Z23 NEED FOR VACCINATION: ICD-10-CM

## 2024-11-04 DIAGNOSIS — Z12.11 COLON CANCER SCREENING: ICD-10-CM

## 2024-11-04 DIAGNOSIS — I10 HYPERTENSION, UNSPECIFIED TYPE: ICD-10-CM

## 2024-11-04 LAB
EST. AVERAGE GLUCOSE BLD GHB EST-MCNC: 148 MG/DL
HBA1C MFR BLD: 6.8 % (ref 0–5.6)

## 2024-11-04 PROCEDURE — 99207 PR FOOT EXAM NO CHARGE: CPT | Performed by: FAMILY MEDICINE

## 2024-11-04 PROCEDURE — 36415 COLL VENOUS BLD VENIPUNCTURE: CPT | Performed by: FAMILY MEDICINE

## 2024-11-04 PROCEDURE — 90471 IMMUNIZATION ADMIN: CPT | Performed by: FAMILY MEDICINE

## 2024-11-04 PROCEDURE — 90480 ADMN SARSCOV2 VAC 1/ONLY CMP: CPT | Performed by: FAMILY MEDICINE

## 2024-11-04 PROCEDURE — 83036 HEMOGLOBIN GLYCOSYLATED A1C: CPT | Performed by: FAMILY MEDICINE

## 2024-11-04 PROCEDURE — 90656 IIV3 VACC NO PRSV 0.5 ML IM: CPT | Performed by: FAMILY MEDICINE

## 2024-11-04 PROCEDURE — 80048 BASIC METABOLIC PNL TOTAL CA: CPT | Performed by: FAMILY MEDICINE

## 2024-11-04 PROCEDURE — 83721 ASSAY OF BLOOD LIPOPROTEIN: CPT | Mod: 59 | Performed by: FAMILY MEDICINE

## 2024-11-04 PROCEDURE — 91320 SARSCV2 VAC 30MCG TRS-SUC IM: CPT | Performed by: FAMILY MEDICINE

## 2024-11-04 PROCEDURE — 80061 LIPID PANEL: CPT | Performed by: FAMILY MEDICINE

## 2024-11-04 PROCEDURE — 99215 OFFICE O/P EST HI 40 MIN: CPT | Mod: 25 | Performed by: FAMILY MEDICINE

## 2024-11-04 NOTE — PATIENT INSTRUCTIONS
-Thank you for choosing the [facility].  -It was a pleasure to see you today.  -Please take a look at the information below for more specific details regarding the treatment plan and recommendations.  -In this after visit summary is a list of your medications and specific instructions.  Please review this carefully as there may be changes made to your medication list.  -If there are any particular questions or concerns, please feel free to reach out to Dr. Hill.  -If any labs have been completed, we will reach out to you about results.  If the results are normal or not concerning, a letter or MyChart message will be sent to you.  If any follow-up is needed, either Dr. Hill or the nurse will give you a call.  If you have not heard regarding results after 2 weeks, please reach out to the clinic.    Patient Instructions:    -Please take your medications as prescribed.  -Check the blood sugars as recommended to help monitor how your blood sugars are doing.  -Be sure to stay well-hydrated by drinking water each day to help your kidneys.  -Maintaining good blood sugar control will help your kidneys stay strong for longer.  -Be sure to complete an eye exam once a year to make sure your eyes are healthy.  -Check your feet a few times a week to monitor for any cuts/wounds or any changes.  If any concerning findings are noted, please return to clinic for reevaluation.  -If you feel unwell (such as dizziness, sweatiness, nausea, fast heartbeat, confused, etc.), be sure to check your blood sugar as it may be low (below 70 mg/dL).  If a low blood sugars noted, please eat or drink something sugary and repeat the blood sugar test in about 30 minutes.  Repeat until the blood sugar is above 70 mg/dL.  If blood sugars are persistently low, please seek immediate medical attention.    -Collect and return the stool test when you receive it.     Please seek immediate medical attention (go to the emergency room or  urgent care) for the following reasons: worsening symptoms, or any concerning changes.      --------------------------------------------------------------------------------------------------------------------    -We are always looking for ways to improve.  You may be selected to receive a survey regarding your visit today.  We encourage you to complete the survey and provide specific, constructive feedback to help us improve our processes.  Thank you for your time!  -Please review the contact information listed on the after visit summary and in the electronic chart.  Below is the phone number that we have on file.  If there are any changes that are needed to be made, please reach out to the clinic.  [phone] (home)

## 2024-11-04 NOTE — PROGRESS NOTES
OFFICE VISIT    Assessment/Plan:     Patient Instructions:    -Please take your medications as prescribed.  -Check the blood sugars as recommended to help monitor how your blood sugars are doing.  -Be sure to stay well-hydrated by drinking water each day to help your kidneys.  -Maintaining good blood sugar control will help your kidneys stay strong for longer.  -Be sure to complete an eye exam once a year to make sure your eyes are healthy.  -Check your feet a few times a week to monitor for any cuts/wounds or any changes.  If any concerning findings are noted, please return to clinic for reevaluation.  -If you feel unwell (such as dizziness, sweatiness, nausea, fast heartbeat, confused, etc.), be sure to check your blood sugar as it may be low (below 70 mg/dL).  If a low blood sugars noted, please eat or drink something sugary and repeat the blood sugar test in about 30 minutes.  Repeat until the blood sugar is above 70 mg/dL.  If blood sugars are persistently low, please seek immediate medical attention.    -Collect and return the stool test when you receive it.     Please seek immediate medical attention (go to the emergency room or urgent care) for the following reasons: worsening symptoms, or any concerning changes.      Leidy was seen today for follow up.  Diagnoses and all orders for this visit:    Type 2 diabetes mellitus without complication, without long-term current use of insulin (H)  Dyslipidemia  Hypertension, unspecified type: improved to 6.8. Continue medications as prescribed. Cholesterol stable. Normal kidney function. BP stable.   -     Basic metabolic panel; Future  -     Hemoglobin A1c; Future  -     Lipid panel reflex to direct LDL Non-fasting; Future    Blurred vision: ongoing. Patient to continue to follow with eye specialist.     Need for vaccination  -     COVID-19 12+ (PFIZER)  -     INFLUENZA VACCINE, SPLIT VIRUS, TRIVALENT,PF (FLUZONE\FLUARIX)    Immunity status testing  -     Hepatitis B  Surface Antibody; Future    Colon cancer screening  -     COLOGUARD(EXACT SCIENCES); Future    Other orders  -     PRIMARY CARE FOLLOW-UP SCHEDULING            Return in about 3 months (around 2/4/2025) for Diabetes.    The diagnoses, treatment options, risk, benefits, and recommendations were reviewed with patient/guardian.  Questions were answered to patient's/guardian satisfaction.  Red flag signs were reviewed.  Patient/guardian is in agreement with above plan.      Subjective: 59 year old female with history of diabetes mellitus type 2, dyslipidemia, blurred vision, pain of right forearm who presents to clinic for the following complaints:   Patient presents with:  Follow Up: Diabetes    Answers submitted by the patient for this visit:  Diabetes Visit (Submitted on 11/4/2024)  Chief Complaint: Chronic problems general questions HPI Form  Frequency of checking blood sugars:: not at all  Diabetic concerns:: none  Paraesthesia present:: none of these symptoms  Have you had a diabetic eye exam within the last year?: No  General Questionnaire (Submitted on 11/4/2024)  Chief Complaint: Chronic problems general questions HPI Form  Questionnaire about: Chronic problems general questions HPI Form (Submitted on 11/4/2024)  Chief Complaint: Chronic problems general questions HPI Form    HTN: controlled on lisinopril 2.5mg once daily.     Diabetes:  Hemoglobin A1C   Date Value Ref Range Status   11/04/2024 6.8 (H) 0.0 - 5.6 % Final     Comment:     Normal <5.7%   Prediabetes 5.7-6.4%    Diabetes 6.5% or higher     Note: Adopted from ADA consensus guidelines.   07/30/2024 7.2 (H) 0.0 - 5.6 % Final     Comment:     Normal <5.7%   Prediabetes 5.7-6.4%    Diabetes 6.5% or higher     Note: Adopted from ADA consensus guidelines.   04/29/2024 5.3 0.0 - 5.6 % Final     Comment:     Normal <5.7%   Prediabetes 5.7-6.4%    Diabetes 6.5% or higher     Note: Adopted from ADA consensus guidelines.          Patient is currently taking  the following medications:  Diabetes Medication(s)       Biguanides       metFORMIN (GLUCOPHAGE XR) 500 MG 24 hr tablet Take 1 tablet (500 mg) by mouth 2 times daily (with meals)            Statin medication: prescribed atorvastatin 40 mg  ASA medication: prescribed     Reviewed hypoglycemia protocol.  Reviewed recommendations for patient to complete annual eye exam.  They saw the eye doctor on 09/18/2024 at [facility] in [place], [place]. EMRE signed for progress notes from 2023 and 2024. No concerning changes were reported by patient, though she is not entirely sure what the results were, either.     Reviewed recommendations for routine feet examination and reasons to seek medical attention.     Last Comprehensive Metabolic Panel:  Sodium   Date Value Ref Range Status   04/29/2024 140 135 - 145 mmol/L Final     Comment:     Reference intervals for this test were updated on 09/26/2023 to more accurately reflect our healthy population. There may be differences in the flagging of prior results with similar values performed with this method. Interpretation of those prior results can be made in the context of the updated reference intervals.      Potassium   Date Value Ref Range Status   04/29/2024 3.6 3.4 - 5.3 mmol/L Final     Chloride   Date Value Ref Range Status   04/29/2024 104 98 - 107 mmol/L Final     Carbon Dioxide (CO2)   Date Value Ref Range Status   04/29/2024 25 22 - 29 mmol/L Final     Anion Gap   Date Value Ref Range Status   04/29/2024 11 7 - 15 mmol/L Final     Glucose   Date Value Ref Range Status   04/29/2024 151 (H) 70 - 99 mg/dL Final     Urea Nitrogen   Date Value Ref Range Status   04/29/2024 11.2 6.0 - 20.0 mg/dL Final     Creatinine   Date Value Ref Range Status   04/29/2024 0.60 0.51 - 0.95 mg/dL Final     GFR Estimate   Date Value Ref Range Status   04/29/2024 >90 >60 mL/min/1.73m2 Final     Calcium   Date Value Ref Range Status   04/29/2024 9.2 8.6 - 10.0 mg/dL Final       HM due was  "reviewed with patient/parent.  Recommendations, risk, benefits were reviewed.  Accepted recommendations were ordered.  Otherwise, patient/parent declined.    Health Maintenance Due   Topic Date Due    ANNUAL REVIEW OF HM ORDERS  Never done    ADVANCE CARE PLANNING  Never done    EYE EXAM  Never done    COLORECTAL CANCER SCREENING  Never done    HEPATITIS B IMMUNIZATION (2 of 3 - 19+ 3-dose series) 12/19/2019    ZOSTER IMMUNIZATION (2 of 2) 10/25/2022    INFLUENZA VACCINE (1) 09/01/2024    COVID-19 Vaccine (3 - 2024-25 season) 09/24/2024     Cologuard test ordered from 08/11/2024.  It does not appear that the test has been completed.  This was reviewed again with patient. They are not sure if they got anything. Cologuard test reordered.     Cologuard testing was reviewed with patient. Cologuard sample collection kit video was shown to patient today in clinic.           Immunization History   Administered Date(s) Administered    COVID-19 12+ (Pfizer) 07/30/2024    COVID-19 Bivalent 18+ (Moderna) 02/10/2023    Hepatitis B, Adult 11/21/2019    Influenza Vaccine >6 months,quad, PF 03/15/2019, 11/06/2019, 11/05/2020, 11/12/2021, 08/30/2022    MMR 03/15/2019, 04/22/2019    Pneumococcal 20 valent Conjugate (Prevnar 20) 07/30/2024    Pneumococcal 23 valent 03/05/2020    TDAP Vaccine (Adacel) 07/07/2020    Td (Adult), Adsorbed 03/15/2019, 04/22/2019    Varicella 07/07/2020, 08/07/2020    Zoster recombinant adjuvanted (SHINGRIX) 08/30/2022         A professional [employer] telephone  was utilized for the office visit.     The 10 point review of system is negative except as stated in the HPI.    Allergies were reviewed and updated.    Objective:   /73 (BP Location: Right arm, Patient Position: Right side, Cuff Size: Adult Regular)   Pulse 104   Temp 98  F (36.7  C) (Oral)   Resp 16   Ht 1.504 m (4' 11.21\")   Wt 60.4 kg (133 lb 1.3 oz)   SpO2 96%   BMI 26.69 kg/m    General: Active, alert, " nontoxic-appearing.  No acute distress.  HEENT: Normocephalic, atraumatic.  Pupils are equal and round.  Sclera is clear.  Normal external ears. Nares patent.  Moist mucous membranes.    Cardiac: Tachycardic, regular rhythm.  S1, S2 present.  No murmurs, rubs, or gallops.  Respiratory/chest: Clear to auscultation bilaterally.  No wheezes, rales, rhonchi.  Breathing is not labored.  No accessory muscle usage.  Extremities: Voluntary movements intact.  Integumentary: Feet: dry skin noted around the toes and heel. Sensations normal to light touch throughout. No wounds/calluses noted. Otherwise, no concerning rash or skin changes appreciated.    Amount of time spent in chart review, direct patient contact, care coordination, and related activities to patient care on the day of appointment: 40 minutes.       Eulogio Hill MD  [facility] [place] [address]  Phone: [phone]  Fax: [phone]    11/6/2024  6:47 AM          Current Outpatient Medications   Medication Sig Dispense Refill    atorvastatin (LIPITOR) 40 MG tablet Take 1 tablet (40 mg) by mouth at bedtime 90 tablet 3    lisinopril (ZESTRIL) 5 MG tablet Take 0.5 tablets (2.5 mg) by mouth daily 90 tablet 3    metFORMIN (GLUCOPHAGE XR) 500 MG 24 hr tablet Take 1 tablet (500 mg) by mouth 2 times daily (with meals) 180 tablet 3    nabumetone (RELAFEN) 500 MG tablet Take 1 tablet (500 mg) by mouth 2 times daily as needed for pain (Take with food) 60 tablet 2     No current facility-administered medications for this visit.       No Known Allergies    Patient Active Problem List    Diagnosis Date Noted    Blurred vision 07/31/2024     Priority: Medium    Pain of right forearm 05/01/2024     Priority: Medium    Dyslipidemia 05/01/2024     Priority: Medium    Type 2 diabetes mellitus without complication, without long-term current use of insulin (H) 05/01/2024     Priority: Medium       Family History   Problem Relation Age of Onset     Coronary Artery Disease No family hx of     Diabetes No family hx of     Hypertension No family hx of     Breast Cancer No family hx of     Ovarian Cancer No family hx of     Colon Cancer No family hx of        Past Surgical History:   Procedure Laterality Date    No previous surgery          Social History     Socioeconomic History    Marital status: Single     Spouse name: Not on file    Number of children: Not on file    Years of education: Not on file    Highest education level: Not on file   Occupational History    Not on file   Tobacco Use    Smoking status: Never     Passive exposure: Never    Smokeless tobacco: Never   Vaping Use    Vaping status: Never Used   Substance and Sexual Activity    Alcohol use: Not on file    Drug use: Not on file    Sexual activity: Not on file   Other Topics Concern    Not on file   Social History Narrative    Not on file     Social Drivers of Health     Financial Resource Strain: Low Risk  (7/30/2024)    Financial Resource Strain     Within the past 12 months, have you or your family members you live with been unable to get utilities (heat, electricity) when it was really needed?: No   Food Insecurity: High Risk (7/30/2024)    Food Insecurity     Within the past 12 months, did you worry that your food would run out before you got money to buy more?: Yes     Within the past 12 months, did the food you bought just not last and you didn t have money to get more?: Yes   Transportation Needs: Low Risk  (7/30/2024)    Transportation Needs     Within the past 12 months, has lack of transportation kept you from medical appointments, getting your medicines, non-medical meetings or appointments, work, or from getting things that you need?: No   Physical Activity: Inactive (7/30/2024)    Exercise Vital Sign     Days of Exercise per Week: 0 days     Minutes of Exercise per Session: 0 min   Stress: Stress Concern Present (7/30/2024)    [place] [employer] - Occupational  Stress Questionnaire     Feeling of Stress : To some extent   Social Connections: Unknown (7/30/2024)    Social Connection and Isolation Panel [NHANES]     Frequency of Communication with Friends and Family: Not on file     Frequency of Social Gatherings with Friends and Family: Once a week     Attends [employer] Services: Not on file     Active Member of Clubs or Organizations: Not on file     Attends Club or Organization Meetings: Not on file     Marital Status: Not on file   Interpersonal Safety: Low Risk  (7/30/2024)    Interpersonal Safety     Do you feel physically and emotionally safe where you currently live?: Yes     Within the past 12 months, have you been hit, slapped, kicked or otherwise physically hurt by someone?: No     Within the past 12 months, have you been humiliated or emotionally abused in other ways by your partner or ex-partner?: No   Housing Stability: Low Risk  (7/30/2024)    Housing Stability     Do you have housing? : Yes     Are you worried about losing your housing?: No

## 2024-11-05 LAB
ANION GAP SERPL CALCULATED.3IONS-SCNC: 14 MMOL/L (ref 7–15)
BUN SERPL-MCNC: 11.8 MG/DL (ref 6–20)
CALCIUM SERPL-MCNC: 9.4 MG/DL (ref 8.8–10.4)
CHLORIDE SERPL-SCNC: 101 MMOL/L (ref 98–107)
CHOLEST SERPL-MCNC: 147 MG/DL
CREAT SERPL-MCNC: 0.56 MG/DL (ref 0.51–0.95)
EGFRCR SERPLBLD CKD-EPI 2021: >90 ML/MIN/1.73M2
FASTING STATUS PATIENT QL REPORTED: NO
FASTING STATUS PATIENT QL REPORTED: NO
GLUCOSE SERPL-MCNC: 223 MG/DL (ref 70–99)
HCO3 SERPL-SCNC: 23 MMOL/L (ref 22–29)
HDLC SERPL-MCNC: 34 MG/DL
LDLC SERPL CALC-MCNC: ABNORMAL MG/DL
LDLC SERPL DIRECT ASSAY-MCNC: 54 MG/DL
NONHDLC SERPL-MCNC: 113 MG/DL
POTASSIUM SERPL-SCNC: 3.6 MMOL/L (ref 3.4–5.3)
SODIUM SERPL-SCNC: 138 MMOL/L (ref 135–145)
TRIGL SERPL-MCNC: 463 MG/DL